# Patient Record
Sex: MALE | Race: WHITE
[De-identification: names, ages, dates, MRNs, and addresses within clinical notes are randomized per-mention and may not be internally consistent; named-entity substitution may affect disease eponyms.]

---

## 2019-08-07 ENCOUNTER — HOSPITAL ENCOUNTER (INPATIENT)
Dept: HOSPITAL 26 - MED | Age: 59
LOS: 2 days | Discharge: ANOTHER HOSPITAL | DRG: 190 | End: 2019-08-09
Attending: GENERAL PRACTICE | Admitting: GENERAL PRACTICE
Payer: COMMERCIAL

## 2019-08-07 VITALS — SYSTOLIC BLOOD PRESSURE: 147 MMHG | DIASTOLIC BLOOD PRESSURE: 109 MMHG

## 2019-08-07 VITALS — DIASTOLIC BLOOD PRESSURE: 91 MMHG | SYSTOLIC BLOOD PRESSURE: 131 MMHG

## 2019-08-07 VITALS — HEIGHT: 71 IN | WEIGHT: 251 LBS | BODY MASS INDEX: 35.14 KG/M2

## 2019-08-07 DIAGNOSIS — E78.5: ICD-10-CM

## 2019-08-07 DIAGNOSIS — Z91.19: ICD-10-CM

## 2019-08-07 DIAGNOSIS — L97.819: ICD-10-CM

## 2019-08-07 DIAGNOSIS — I11.0: ICD-10-CM

## 2019-08-07 DIAGNOSIS — G47.00: ICD-10-CM

## 2019-08-07 DIAGNOSIS — I50.43: ICD-10-CM

## 2019-08-07 DIAGNOSIS — L97.829: ICD-10-CM

## 2019-08-07 DIAGNOSIS — E44.0: ICD-10-CM

## 2019-08-07 DIAGNOSIS — F15.10: ICD-10-CM

## 2019-08-07 DIAGNOSIS — I21.4: Primary | ICD-10-CM

## 2019-08-07 DIAGNOSIS — F17.210: ICD-10-CM

## 2019-08-07 DIAGNOSIS — I73.9: ICD-10-CM

## 2019-08-07 DIAGNOSIS — I87.8: ICD-10-CM

## 2019-08-07 LAB
ALBUMIN FLD-MCNC: 3.3 G/DL (ref 3.4–5)
AMYLASE SERPL-CCNC: 26 U/L (ref 25–115)
ANION GAP SERPL CALCULATED.3IONS-SCNC: 11.3 MMOL/L (ref 8–16)
APPEARANCE UR: CLEAR
AST SERPL-CCNC: 31 U/L (ref 15–37)
BARBITURATES UR QL SCN: (no result) NG/ML
BASOPHILS # BLD AUTO: 0.1 K/UL (ref 0–0.22)
BASOPHILS NFR BLD AUTO: 0.7 % (ref 0–2)
BENZODIAZ UR QL SCN: (no result) NG/ML
BILIRUB SERPL-MCNC: 0.8 MG/DL (ref 0–1)
BILIRUB UR QL STRIP: NEGATIVE
BUN SERPL-MCNC: 9 MG/DL (ref 7–18)
BZE UR QL SCN: (no result) NG/ML
CANNABINOIDS UR QL SCN: (no result) NG/ML
CHLORIDE SERPL-SCNC: 106 MMOL/L (ref 98–107)
CHOLEST/HDLC SERPL: 4.9 {RATIO} (ref 1–4.5)
CO2 SERPL-SCNC: 26.6 MMOL/L (ref 21–32)
COLOR UR: YELLOW
CREAT SERPL-MCNC: 1.2 MG/DL (ref 0.7–1.3)
EOSINOPHIL # BLD AUTO: 0.1 K/UL (ref 0–0.4)
EOSINOPHIL NFR BLD AUTO: 1.4 % (ref 0–4)
ERYTHROCYTE [DISTWIDTH] IN BLOOD BY AUTOMATED COUNT: 15.8 % (ref 11.6–13.7)
GFR SERPL CREATININE-BSD FRML MDRD: 80 ML/MIN (ref 90–?)
GLUCOSE SERPL-MCNC: 131 MG/DL (ref 74–106)
GLUCOSE UR STRIP-MCNC: NEGATIVE MG/DL
HCT VFR BLD AUTO: 42.9 % (ref 36–52)
HDLC SERPL-MCNC: 30 MG/DL (ref 40–60)
HGB BLD-MCNC: 13.9 G/DL (ref 12–18)
HGB UR QL STRIP: NEGATIVE
LDLC SERPL CALC-MCNC: 80 MG/DL (ref 60–100)
LEUKOCYTE ESTERASE UR QL STRIP: NEGATIVE
LIPASE SERPL-CCNC: 86 U/L (ref 73–393)
LYMPHOCYTES # BLD AUTO: 1.5 K/UL (ref 2–11.5)
LYMPHOCYTES NFR BLD AUTO: 20.2 % (ref 20.5–51.1)
MAGNESIUM SERPL-MCNC: 1.8 MG/DL (ref 1.8–2.4)
MCH RBC QN AUTO: 28 PG (ref 27–31)
MCHC RBC AUTO-ENTMCNC: 32 G/DL (ref 33–37)
MCV RBC AUTO: 87.5 FL (ref 80–94)
MONOCYTES # BLD AUTO: 0.7 K/UL (ref 0.8–1)
MONOCYTES NFR BLD AUTO: 8.9 % (ref 1.7–9.3)
NEUTROPHILS # BLD AUTO: 5.2 K/UL (ref 1.8–7.7)
NEUTROPHILS NFR BLD AUTO: 68.8 % (ref 42.2–75.2)
NITRITE UR QL STRIP: NEGATIVE
OPIATES UR QL SCN: (no result) NG/ML
PCP UR QL SCN: (no result) NG/ML
PH UR STRIP: 7 [PH] (ref 5–9)
PHOSPHATE SERPL-MCNC: 3.3 MG/DL (ref 2.5–4.9)
PLATELET # BLD AUTO: 203 K/UL (ref 140–450)
POTASSIUM SERPL-SCNC: 3.9 MMOL/L (ref 3.5–5.1)
PROTHROMBIN TIME: 10.4 SECS (ref 10.8–13.4)
RBC # BLD AUTO: 4.9 MIL/UL (ref 4.2–6.1)
SODIUM SERPL-SCNC: 140 MMOL/L (ref 136–145)
T4 FREE SERPL-MCNC: 0.97 NG/DL (ref 0.76–1.46)
TRIGL SERPL-MCNC: 183 MG/DL (ref 30–150)
TSH SERPL DL<=0.05 MIU/L-ACNC: 2.25 UIU/ML (ref 0.34–3.74)
WBC # BLD AUTO: 7.6 K/UL (ref 4.8–10.8)

## 2019-08-07 PROCEDURE — C1751 CATH, INF, PER/CENT/MIDLINE: HCPCS

## 2019-08-07 RX ADMIN — Medication SCH MG: at 21:04

## 2019-08-07 RX ADMIN — TRIAMCINOLONE ACETONIDE SCH GM: 1 CREAM TOPICAL at 21:00

## 2019-08-07 RX ADMIN — SODIUM CHLORIDE SCH MLS/HR: 9 INJECTION, SOLUTION INTRAVENOUS at 20:27

## 2019-08-07 RX ADMIN — HEPARIN SODIUM SCH MLS/HR: 5000 INJECTION, SOLUTION INTRAVENOUS at 19:58

## 2019-08-07 RX ADMIN — ATORVASTATIN CALCIUM SCH MG: 20 TABLET, FILM COATED ORAL at 21:04

## 2019-08-07 RX ADMIN — IPRATROPIUM BROMIDE AND ALBUTEROL SULFATE PRN ML: .5; 3 SOLUTION RESPIRATORY (INHALATION) at 21:29

## 2019-08-07 NOTE — NUR
RECEIVED PATIENT ON 2L NASAL CANNULA, PULSE OX SAT 96%. PATIENT COMPLAINS OF FEELING 
SOB/APPEARS TO BE IN MILD DISTRESS AND PRESENTS WITH EXPIRATORY WHEEZING. PRN BREATHING 
TREATMENT ADMINISTERED. TOLERATED TX WELL WITHOUT ADVERSE SIDE EFFECTS AND RESPONDED TO 
TREATMENT WITH IMPROVED AERATION. PLACED PATIENT BACK ON 2L NC POST TX. WILL CONTINUE TO 
MONITOR.

## 2019-08-07 NOTE — NUR
PT WAS GIVEN URINAL PER REQUEST. NO SIGNS AND SYMPTOMS OF DISTRES NOTED, FULL 
CLEAR SPEECH. WILL CONTINUE TO MONITOR.

## 2019-08-07 NOTE — NUR
BIB SELF. AAO X4 C/O LEFT CHEST PAIN NON RADIATING & SOB  X 2 HOURS . RADHIKA LEGS 
SWELLING & SLIGHT REDNESS. PERRLA BRISK 3MM, FULL CLEAR SPEECH, EQUAL RADHIKA 
STRENGTH TO UPPER AND LOWER EXTREMITIES. CLEAR RADHIKA LUNGS UPON AUSCULTATION. PT 
PLACED ON FULL CARDIAC MONITOR. MD ER TO EVALUATE PT.

## 2019-08-07 NOTE — NUR
Received endorsement from AM shift RN; patient A/Ox4, able to make needs known, English 
speaking, ambulatory. Introduced self, updated board. No SOB or distress noted, on O2 2LPM 
via nasal cannula. IV site on left antecubital, 20 gauge, saline locked. Skin intact, but 
noted with erythema on bilateral lower extremity. Bed in the lowest position, call light 
within reach. Initial assessment done. Will continue to monitor.

## 2019-08-07 NOTE — NUR
Vitals taken, no distress noted. Patient asleep on right lateral side, visible chest rise 
and fall noted.

-------------------------------------------------------------------------------

Addendum: 08/08/19 at 0153 by Gianluca Albrecht RN

-------------------------------------------------------------------------------

SOB noted, called RT for PRN breathing treatment.

## 2019-08-07 NOTE — NUR
PATIENT ARRIVED FROM ER. NO DISTRESS NOTED. V/S STABLE. ON O2 2L/MIN VIA NC. SKIN INTACT. IV 
SITE INTACT, PATENT, AND ON SALINE LOCK. ORIENTED PATIENT TO ROOM AND CALL LIGHT. REVIEWED 
PLAN OF CARE WITH PATIENT. PATIENT VERBALIZED UNDERSTANDING. SAFETY MEASURES IN PLACE, CALL 
LIGHT WITHIN REACH. WILL CONTINUE TO MONITOR.

## 2019-08-07 NOTE — NUR
Patient will be admitted to care of DR BRAXTON. Admited to TELE.  Will go to room 
119 B. Belongings list completed.  Report to RICHARD GAO.

## 2019-08-08 VITALS — SYSTOLIC BLOOD PRESSURE: 96 MMHG | DIASTOLIC BLOOD PRESSURE: 63 MMHG

## 2019-08-08 VITALS — DIASTOLIC BLOOD PRESSURE: 70 MMHG | SYSTOLIC BLOOD PRESSURE: 105 MMHG

## 2019-08-08 VITALS — SYSTOLIC BLOOD PRESSURE: 109 MMHG | DIASTOLIC BLOOD PRESSURE: 75 MMHG

## 2019-08-08 VITALS — DIASTOLIC BLOOD PRESSURE: 69 MMHG | SYSTOLIC BLOOD PRESSURE: 108 MMHG

## 2019-08-08 VITALS — DIASTOLIC BLOOD PRESSURE: 87 MMHG | SYSTOLIC BLOOD PRESSURE: 116 MMHG

## 2019-08-08 VITALS — DIASTOLIC BLOOD PRESSURE: 99 MMHG | SYSTOLIC BLOOD PRESSURE: 140 MMHG

## 2019-08-08 LAB
ANION GAP SERPL CALCULATED.3IONS-SCNC: 11.6 MMOL/L (ref 8–16)
BASOPHILS # BLD AUTO: 0 K/UL (ref 0–0.22)
BASOPHILS NFR BLD AUTO: 0.6 % (ref 0–2)
BUN SERPL-MCNC: 12 MG/DL (ref 7–18)
CHLORIDE SERPL-SCNC: 103 MMOL/L (ref 98–107)
CO2 SERPL-SCNC: 29.9 MMOL/L (ref 21–32)
CREAT SERPL-MCNC: 1.3 MG/DL (ref 0.7–1.3)
EOSINOPHIL # BLD AUTO: 0.1 K/UL (ref 0–0.4)
EOSINOPHIL NFR BLD AUTO: 1.1 % (ref 0–4)
ERYTHROCYTE [DISTWIDTH] IN BLOOD BY AUTOMATED COUNT: 16 % (ref 11.6–13.7)
GFR SERPL CREATININE-BSD FRML MDRD: 73 ML/MIN (ref 90–?)
GLUCOSE SERPL-MCNC: 136 MG/DL (ref 74–106)
HCT VFR BLD AUTO: 41.9 % (ref 36–52)
HGB BLD-MCNC: 13.5 G/DL (ref 12–18)
LYMPHOCYTES # BLD AUTO: 1.6 K/UL (ref 2–11.5)
LYMPHOCYTES NFR BLD AUTO: 22.3 % (ref 20.5–51.1)
MAGNESIUM SERPL-MCNC: 1.9 MG/DL (ref 1.8–2.4)
MCH RBC QN AUTO: 29 PG (ref 27–31)
MCHC RBC AUTO-ENTMCNC: 32 G/DL (ref 33–37)
MCV RBC AUTO: 88.7 FL (ref 80–94)
MONOCYTES # BLD AUTO: 0.5 K/UL (ref 0.8–1)
MONOCYTES NFR BLD AUTO: 7.1 % (ref 1.7–9.3)
NEUTROPHILS # BLD AUTO: 5 K/UL (ref 1.8–7.7)
NEUTROPHILS NFR BLD AUTO: 68.9 % (ref 42.2–75.2)
PHOSPHATE SERPL-MCNC: 3.7 MG/DL (ref 2.5–4.9)
PLATELET # BLD AUTO: 196 K/UL (ref 140–450)
POTASSIUM SERPL-SCNC: 4.5 MMOL/L (ref 3.5–5.1)
RBC # BLD AUTO: 4.72 MIL/UL (ref 4.2–6.1)
SODIUM SERPL-SCNC: 140 MMOL/L (ref 136–145)
WBC # BLD AUTO: 7.2 K/UL (ref 4.8–10.8)

## 2019-08-08 PROCEDURE — 02HV33Z INSERTION OF INFUSION DEVICE INTO SUPERIOR VENA CAVA, PERCUTANEOUS APPROACH: ICD-10-PCS | Performed by: GENERAL PRACTICE

## 2019-08-08 PROCEDURE — B548ZZA ULTRASONOGRAPHY OF SUPERIOR VENA CAVA, GUIDANCE: ICD-10-PCS | Performed by: GENERAL PRACTICE

## 2019-08-08 RX ADMIN — Medication SCH MG: at 09:03

## 2019-08-08 RX ADMIN — TRIAMCINOLONE ACETONIDE SCH GM: 1 CREAM TOPICAL at 21:39

## 2019-08-08 RX ADMIN — HEPARIN SODIUM SCH MLS/HR: 5000 INJECTION, SOLUTION INTRAVENOUS at 17:31

## 2019-08-08 RX ADMIN — Medication SCH EA: at 09:14

## 2019-08-08 RX ADMIN — LISINOPRIL SCH MG: 10 TABLET ORAL at 09:03

## 2019-08-08 RX ADMIN — Medication SCH EA: at 13:04

## 2019-08-08 RX ADMIN — Medication SCH MG: at 21:00

## 2019-08-08 RX ADMIN — HEPARIN SODIUM SCH MLS/HR: 5000 INJECTION, SOLUTION INTRAVENOUS at 10:00

## 2019-08-08 RX ADMIN — HEPARIN SODIUM SCH MLS/HR: 5000 INJECTION, SOLUTION INTRAVENOUS at 16:33

## 2019-08-08 RX ADMIN — FUROSEMIDE SCH MG: 10 INJECTION, SOLUTION INTRAMUSCULAR; INTRAVENOUS at 21:38

## 2019-08-08 RX ADMIN — ATORVASTATIN CALCIUM SCH MG: 20 TABLET, FILM COATED ORAL at 21:39

## 2019-08-08 RX ADMIN — HEPARIN SODIUM SCH MLS/HR: 5000 INJECTION, SOLUTION INTRAVENOUS at 03:47

## 2019-08-08 RX ADMIN — IPRATROPIUM BROMIDE AND ALBUTEROL SULFATE SCH ML: .5; 3 SOLUTION RESPIRATORY (INHALATION) at 13:20

## 2019-08-08 RX ADMIN — TRIAMCINOLONE ACETONIDE SCH GM: 1 CREAM TOPICAL at 09:04

## 2019-08-08 RX ADMIN — SODIUM CHLORIDE SCH MLS/HR: 9 INJECTION, SOLUTION INTRAVENOUS at 16:43

## 2019-08-08 RX ADMIN — Medication SCH EA: at 16:20

## 2019-08-08 RX ADMIN — Medication SCH MG: at 09:02

## 2019-08-08 RX ADMIN — IPRATROPIUM BROMIDE AND ALBUTEROL SULFATE SCH ML: .5; 3 SOLUTION RESPIRATORY (INHALATION) at 19:15

## 2019-08-08 RX ADMIN — IPRATROPIUM BROMIDE AND ALBUTEROL SULFATE PRN ML: .5; 3 SOLUTION RESPIRATORY (INHALATION) at 00:01

## 2019-08-08 NOTE — NUR
PRN BREATHING TREATMENT ADMINISTERED DUE TO INCREASED FEELING OF SHORTNESS OF BREATH. 
TOLERATED TX WELL, NO ADVERSE SIDE EFFECTS. WILL CONTINUE TO MONITOR.

## 2019-08-08 NOTE — NUR
Received lab result: APTT 48.5, no change in heparin drip protocol. Will continue to run at 
16mL/hr.

## 2019-08-08 NOTE — NUR
Clinicals faxed to Julio Willett [facility] [phone]. [facility] [phone].

## 2019-08-08 NOTE — NUR
GAVE CHANGE OF SHIFT BEDSIDE REPORT TO PM NURSE.  PATIENT'S VITALS ARE STABLE AND NO SIGNS 
OF DISTRESS AT THIS TIME ARE NOTED.

## 2019-08-08 NOTE — NUR
RECEIVED BEDSIDE REPORT FROM NIGHT SHIFT NURSE. PATIENT IS AWAKE, ALERT AND ORIENTEDX4. NO 
RESP DISTRESS ON 2L NC. SKIN HAS ERYTHEMA BLE, POSSIBLY D/T VENOUS STASIS. PATIENT IS 
AMBULATORY. CONTINENT. L AC 20G HEPARIN DRIP AT 14ML/HR. L FA 22G SL. CLEAN, DRY AND INTACT. 
TELE MONITOR IN PLACE. PATIENT ABLE TO MAKE NEEDS KNOWN. BED IN LOW POSITION. CALL LIGHT 
WITHIN REACH. WILL CONTINUE TO MONITOR THE PATIENT

## 2019-08-08 NOTE — NUR
APTT 41.6, HEPARIN BOLUS OF 2,700 UNITS AND INCREASED DRIP TO 16ML/HR. TOLD DR MALDONADO TO 
ADD APTT LEVEL AT 1130PM. WILL CONTINUE TO MONITOR

## 2019-08-08 NOTE — NUR
Spoke with Mo  [phone]. Tracking # [id number]. AUTH for transportation [facility] 
[id number]. AUTH for [facility] [id number].

## 2019-08-08 NOTE — NUR
Received endorsement from AM shift RN; patient A/Ox4, able to make needs known, English 
speaking, ambulatory. Introduced self, updated board. No SOB or distress noted, on O2 2LPM 
via nasal cannula. IV site on left antecubital, 20 gauge, saline locked; left forearm, 22 
gauge, saline locked, and right upper arm PICC line, double lumen, running Heparin drip at 
16mL/hr. Skin intact, but noted with erythema on bilateral lower extremity. Bed in the 
lowest position, call light within reach. Initial assessment done. Will continue to monitor.

## 2019-08-08 NOTE — NUR
Patients vitals stable; all due meds given, will be endorsed to AM shift RN for continuity 
of care.

## 2019-08-08 NOTE — NUR
DAUGHTER CALLED HE WANTS TO KNOW HOW THE PATIENT IS DOING. PER PATIENT DO NOT RELEASE 
INFORMATION TO ANYONE. IF THEY WANT TO KNOW LET THEM CALL HIM DIRECTLY.

## 2019-08-08 NOTE — NUR
PATIENT HAS BEEN SCREENED AND CATEGORIZED AS MODERATE NUTRITION RISK. PATIENT WILL BE SEEN 
WITHIN 3-5 DAYS OF ADMISSION.



08/10/19  08/12/19



DANIEL SAVAGE RD

## 2019-08-09 ENCOUNTER — HOSPITAL ENCOUNTER (INPATIENT)
Dept: HOSPITAL 1 - DU | Age: 59
LOS: 2 days | Discharge: HOME | DRG: 190 | End: 2019-08-11
Attending: HOSPITALIST | Admitting: HOSPITALIST
Payer: COMMERCIAL

## 2019-08-09 VITALS — DIASTOLIC BLOOD PRESSURE: 72 MMHG | SYSTOLIC BLOOD PRESSURE: 112 MMHG

## 2019-08-09 VITALS — WEIGHT: 235.5 LBS | BODY MASS INDEX: 32.97 KG/M2 | HEIGHT: 71 IN

## 2019-08-09 VITALS — SYSTOLIC BLOOD PRESSURE: 108 MMHG | DIASTOLIC BLOOD PRESSURE: 74 MMHG

## 2019-08-09 VITALS — SYSTOLIC BLOOD PRESSURE: 111 MMHG | DIASTOLIC BLOOD PRESSURE: 72 MMHG

## 2019-08-09 VITALS — DIASTOLIC BLOOD PRESSURE: 79 MMHG | SYSTOLIC BLOOD PRESSURE: 114 MMHG

## 2019-08-09 VITALS — DIASTOLIC BLOOD PRESSURE: 78 MMHG | SYSTOLIC BLOOD PRESSURE: 113 MMHG

## 2019-08-09 VITALS — DIASTOLIC BLOOD PRESSURE: 46 MMHG | SYSTOLIC BLOOD PRESSURE: 97 MMHG

## 2019-08-09 DIAGNOSIS — E78.5: ICD-10-CM

## 2019-08-09 DIAGNOSIS — I21.4: Primary | ICD-10-CM

## 2019-08-09 DIAGNOSIS — F17.210: ICD-10-CM

## 2019-08-09 DIAGNOSIS — F15.10: ICD-10-CM

## 2019-08-09 DIAGNOSIS — Z91.14: ICD-10-CM

## 2019-08-09 DIAGNOSIS — I87.8: ICD-10-CM

## 2019-08-09 DIAGNOSIS — I11.0: ICD-10-CM

## 2019-08-09 DIAGNOSIS — I25.2: ICD-10-CM

## 2019-08-09 DIAGNOSIS — I42.9: ICD-10-CM

## 2019-08-09 DIAGNOSIS — G47.00: ICD-10-CM

## 2019-08-09 DIAGNOSIS — I50.43: ICD-10-CM

## 2019-08-09 DIAGNOSIS — E11.9: ICD-10-CM

## 2019-08-09 LAB
ANION GAP SERPL CALCULATED.3IONS-SCNC: 10.3 MMOL/L (ref 8–16)
BASOPHILS # BLD AUTO: 0.1 K/UL (ref 0–0.22)
BASOPHILS NFR BLD AUTO: 0.6 % (ref 0–2)
BUN SERPL-MCNC: 18 MG/DL (ref 7–18)
CHLORIDE SERPL-SCNC: 104 MMOL/L (ref 98–107)
CO2 SERPL-SCNC: 30.9 MMOL/L (ref 21–32)
CREAT SERPL-MCNC: 1.3 MG/DL (ref 0.7–1.3)
EOSINOPHIL # BLD AUTO: 0.1 K/UL (ref 0–0.4)
EOSINOPHIL NFR BLD AUTO: 1.4 % (ref 0–4)
ERYTHROCYTE [DISTWIDTH] IN BLOOD BY AUTOMATED COUNT: 16.1 % (ref 11.6–13.7)
GFR SERPL CREATININE-BSD FRML MDRD: 73 ML/MIN (ref 90–?)
GLUCOSE SERPL-MCNC: 118 MG/DL (ref 74–106)
HCT VFR BLD AUTO: 42.7 % (ref 36–52)
HGB BLD-MCNC: 14 G/DL (ref 12–18)
LYMPHOCYTES # BLD AUTO: 1.7 K/UL (ref 2–11.5)
LYMPHOCYTES NFR BLD AUTO: 17 % (ref 20.5–51.1)
MAGNESIUM SERPL-MCNC: 2.1 MG/DL (ref 1.8–2.4)
MCH RBC QN AUTO: 29 PG (ref 27–31)
MCHC RBC AUTO-ENTMCNC: 33 G/DL (ref 33–37)
MCV RBC AUTO: 88.1 FL (ref 80–94)
MONOCYTES # BLD AUTO: 0.9 K/UL (ref 0.8–1)
MONOCYTES NFR BLD AUTO: 8.7 % (ref 1.7–9.3)
NEUTROPHILS # BLD AUTO: 7.1 K/UL (ref 1.8–7.7)
NEUTROPHILS NFR BLD AUTO: 72.3 % (ref 42.2–75.2)
PHOSPHATE SERPL-MCNC: 4.9 MG/DL (ref 2.5–4.9)
PLATELET # BLD AUTO: 208 K/UL (ref 140–450)
POTASSIUM SERPL-SCNC: 4.2 MMOL/L (ref 3.5–5.1)
RBC # BLD AUTO: 4.84 MIL/UL (ref 4.2–6.1)
SODIUM SERPL-SCNC: 141 MMOL/L (ref 136–145)
WBC # BLD AUTO: 9.8 K/UL (ref 4.8–10.8)

## 2019-08-09 PROCEDURE — G0378 HOSPITAL OBSERVATION PER HR: HCPCS

## 2019-08-09 RX ADMIN — IPRATROPIUM BROMIDE AND ALBUTEROL SULFATE SCH ML: .5; 3 SOLUTION RESPIRATORY (INHALATION) at 09:31

## 2019-08-09 RX ADMIN — Medication SCH MG: at 20:32

## 2019-08-09 RX ADMIN — FUROSEMIDE SCH MG: 10 INJECTION, SOLUTION INTRAMUSCULAR; INTRAVENOUS at 20:32

## 2019-08-09 RX ADMIN — Medication SCH EA: at 12:56

## 2019-08-09 RX ADMIN — ATORVASTATIN CALCIUM SCH MG: 20 TABLET, FILM COATED ORAL at 20:32

## 2019-08-09 RX ADMIN — LISINOPRIL SCH MG: 10 TABLET ORAL at 09:00

## 2019-08-09 RX ADMIN — Medication SCH EA: at 17:31

## 2019-08-09 RX ADMIN — SODIUM CHLORIDE SCH MLS/HR: 9 INJECTION, SOLUTION INTRAVENOUS at 17:05

## 2019-08-09 RX ADMIN — IPRATROPIUM BROMIDE AND ALBUTEROL SULFATE SCH ML: .5; 3 SOLUTION RESPIRATORY (INHALATION) at 14:24

## 2019-08-09 RX ADMIN — HEPARIN SODIUM SCH MLS/HR: 5000 INJECTION, SOLUTION INTRAVENOUS at 18:27

## 2019-08-09 RX ADMIN — FUROSEMIDE SCH MG: 10 INJECTION, SOLUTION INTRAMUSCULAR; INTRAVENOUS at 09:00

## 2019-08-09 RX ADMIN — TRIAMCINOLONE ACETONIDE SCH GM: 1 CREAM TOPICAL at 09:22

## 2019-08-09 RX ADMIN — TRIAMCINOLONE ACETONIDE SCH GM: 1 CREAM TOPICAL at 20:33

## 2019-08-09 RX ADMIN — Medication SCH EA: at 09:22

## 2019-08-09 RX ADMIN — HEPARIN SODIUM SCH MLS/HR: 5000 INJECTION, SOLUTION INTRAVENOUS at 00:30

## 2019-08-09 RX ADMIN — Medication SCH MG: at 09:00

## 2019-08-09 RX ADMIN — Medication SCH MG: at 09:20

## 2019-08-09 RX ADMIN — IPRATROPIUM BROMIDE AND ALBUTEROL SULFATE SCH ML: .5; 3 SOLUTION RESPIRATORY (INHALATION) at 20:37

## 2019-08-09 RX ADMIN — HEPARIN SODIUM SCH MLS/HR: 5000 INJECTION, SOLUTION INTRAVENOUS at 08:23

## 2019-08-09 NOTE — NUR
GAVE REPORT TO RICHARD VIVAR FROM [facility] FOR ROOM 208A, FOR PATIENT'S 
CONTINUITY OF CARE. PATIENT IS AWAKE, ALERT, ORIENTED X 4, IS ON ROOM AIR, HAS RIGHT UPPER 
ARM DOUBLE LUMEN PICC LINE, AND LEFT ARM 20GA SALINE LOCK, HAS BILATERAL LOWER EXTREMITY 
ERYTHEMA, OTHERWISE, SKIN IS INTACT. LATEST VS: /68, T 98.5, O2 95%, P 94, RR 18. 
ADMINISTERED SCHEDULED PO, OINTMENT, AND IV PUSH MEDICATIONS AS ORDERED, PATIENT TOLERATED 
THEM WELL. WILL BE TRANSPORTED VIA AMBULANCE.

## 2019-08-09 NOTE — NUR
ADMINISTERED SCHEDULED MEDICATIONS. PATIENT TOLERATED WELL. PATIENT DENIES ANY PAIN. NO 
OTHER NEEDS AT THIS TIME, WILL CONTINUE TO MONITOR.

## 2019-08-09 NOTE — NUR
D/C PLANNING:

CALLED [facility] HOUSE SUPERVISOR KRISTA -[phone]. ADVISED NO ONE HAS FAXED REQUEST TO 
TRANSFER. FAXED TRANSFER REQUEST INCLUDED AUTHORIZATION FOR [facility] AND [facility] TRANSPORT AUTH.  
WILL FOLLOW UP WITH HOUSE SUPERVISOR FOR ACCEPTING MD AND CARDIOLOGIST. FAXED PACKET TO 
TRANSFER TO [facility] HOUSE SUPERVISOR [phone]

## 2019-08-09 NOTE — NUR
USED PICC TO OBTAIN BLOOD SAMPLE. PATIENT TOLERATED WELL. NO OTHER NEEDS AT THIS TIME. WILL 
CONTINUE TO MONITOR.

## 2019-08-09 NOTE — NUR
ADMINISTERED SCHEDULED MEDICATIONS. PATIENTS BLOOD PRESSURE MEDICATIONS HELD FOR BP OF 
112/72. NO OTHER NEEDS AT THIS TIME, WILL CONTINUE TO MONITOR.

## 2019-08-09 NOTE — NUR
ADMINISTERED SCHEDULED MEDICATION. ADJUSTED HEPARIN DRIP PER PTT PROTOCOL. PATIENT TOLERATED 
WELL. NO OTHER NEEDS AT THIS TIME, WILL CONTINUE TO MONITOR.

## 2019-08-09 NOTE — NUR
ENDORSED TO NIGHT SHIFT NURSE FOR CONTINUITY OF CARE. PATIENT IS STABLE AT THIS TIME.

-------------------------------------------------------------------------------

Addendum: 08/09/19 at 1924 by Stella Grajeda RN

-------------------------------------------------------------------------------

DISCHARGE PAPERWORK DONE. ENDORSED TO SERAFIN TO GIVE REPORT TO [facility]

## 2019-08-09 NOTE — NUR
RECEIVED BEDSIDE REPORT FROM AM SHIFT RN YANET, FOR PATIENT'S CONTINUITY OF CARE. PATIENT 
IS ALERT, AWAKE, ORIENTED X4, ON ROOM AIR, HAS RIGHT UPPER ARM 2 LUMEN PICC LINE, AND LEFT 
ARM 20G SALINE LOCK. PATIENT IS CURRENTLY ON STABLE CONDITION AND WILL BE TRANSFERRED TO 
[facility] FOR CONTINUITY OF CARE.

## 2019-08-09 NOTE — NUR
RECEIVED PT AS A DIRECT ADMIT FROM [facility]. PT WAS TRANSFERRED BY [facility] VIA
STRETCHER. PT STATED THAT HE CAME IN DUE TO CHEST PAIN X3 DAYS AND SOB X1
MONTH. AAOX4. DENIES HEADACHE/DIZZINESS. ABLE TO FOLLOW COMMANDS. DENIES CHEST
PAIN/PRESSURE, SR W/ BBB AND DEPRESSED ST. HR=85. NO SOB NOTED, LUNG SOUNDS
DIMINISHED ON AUSCULTATION, O2 SAT=94% RA. DENIES ABDOMINAL DISCOMFORT. BOWEL
SOUNDS ACTIVE. ABLE TO PASS GAS. ABDOMEN IS DISTENDED AND MILDLY FIRM. VOIDS.
W/ DARK DISCOLORATION AND SCABS ON BLE. W/ RIGHT ARM PICC LINE DOUBLE LUMEN
AND IV ON THE LFA GAUGE 20. CALL LIGHT ON REACH. SIDE RAILS UPX2. PRIMARY
NURSE JOHAN AT BEDSIDE FOR CONTINUITY OF CARE

## 2019-08-09 NOTE — NUR
WOUND CARE EVALUATION NOTE:

BLE SKIN ASSESSMENT DONE, NO OPEN ACTIVE WOUND,ONE SMALL 0.3X0.3 CM CLEAR FLUID FILLED 
BLISTER TO LLE WITH SKIN INTACT AND MULTIPLE DRY SCABS TO BLE, DARK SKIN PIGMENTATION , NO 
ERYTHEMA, NO PAIN AND NOT WARM TO TOUCH, PT C/O ITCHINESS.

RECOMMENDATIONS TO CONTINUE SAME TREATMENTS AS ORDERED.

## 2019-08-09 NOTE — NUR
RECEIVED ENDORSEMENT FROM [facility] NURSE. PATIENT IS AAOX4, ENGLISH SPEAKING. RESPIRATIONS 
ARE EVEN AND UNLABORED ON ROOM AIR. PATIENT DENIES ANY PAIN. RIGHT UPPER ARM DOUBLE LUMEN 
PICC INTACT, PATENT,AND INFUSING HEPARIN DRIP. LEFT AC 20G INTACT AND SL. LEFT FA 22G INTACT 
AND SL. PLAN OF CARE WAS REVIEWED WITH PATIENT, PATIENT VERBALIZED UNDERSTANDING. SAFETY 
MEASURES IN PLACE, CALL LIGHT WITHIN REACH.

## 2019-08-09 NOTE — NUR
PATIENT SLEEPING. ORDERED TURKEY SANDWICH FOR PATIENT PER PATIENT REQUEST. PATIENT DENIES 
ANY PAIN. NO OTHER NEEDS AT THIS TIME, WILL CONTINUE TO MONITOR.

## 2019-08-09 NOTE — NUR
PT STATES HE WANTS TO EAT AT THIS TIME AND WANTS BREATHING TX AFTER BREAKFAST. PT NOT IN ANY 
DISTRESS AT THIS TIME. WILL ADMINISTER BREATHING TX AT A LATER TIME.

## 2019-08-09 NOTE — NUR
PATIENT SIGNED DISCHARGE PAPERS, ACKNOWLEDGE AND VERBALIZED UNDERSTANDING REASON FOR 
TRANSFER. PATIENT IS TRANSPORTED VIA GURNEY IN AMBULANCE. RECEIVING RN, [phone].

## 2019-08-09 NOTE — NUR
PATIENT RESTING IN BED. PATIENT DENIES ANY PAIN AT THIS TIME. NO OTHER NEEDS AT THIS TIME, 
WILL CONTINUE TO MONITOR.

## 2019-08-10 VITALS — SYSTOLIC BLOOD PRESSURE: 112 MMHG | DIASTOLIC BLOOD PRESSURE: 77 MMHG

## 2019-08-10 VITALS — SYSTOLIC BLOOD PRESSURE: 100 MMHG | DIASTOLIC BLOOD PRESSURE: 71 MMHG

## 2019-08-10 VITALS — SYSTOLIC BLOOD PRESSURE: 102 MMHG | DIASTOLIC BLOOD PRESSURE: 65 MMHG

## 2019-08-10 VITALS — DIASTOLIC BLOOD PRESSURE: 88 MMHG | SYSTOLIC BLOOD PRESSURE: 125 MMHG

## 2019-08-10 VITALS — DIASTOLIC BLOOD PRESSURE: 86 MMHG | SYSTOLIC BLOOD PRESSURE: 122 MMHG

## 2019-08-10 LAB
AMPHETAMINES UR QL SCN: (no result)
BASOPHILS NFR BLD: 0.7 % (ref 0–2)
BUN SERPL-MCNC: 18 MG/DL (ref 7–18)
CALCIUM SERPL-MCNC: 9.1 MG/DL (ref 8.5–10.1)
CHLORIDE SERPL-SCNC: 104 MMOL/L (ref 98–107)
CHOLEST SERPL-MCNC: 130 MG/DL (ref ?–200)
CHOLEST/HDLC SERPL: 3.5 MG/DL
CO2 SERPL-SCNC: 29.7 MMOL/L (ref 21–32)
CREAT SERPL-MCNC: 1.3 MG/DL (ref 0.7–1.3)
ERYTHROCYTE [DISTWIDTH] IN BLOOD BY AUTOMATED COUNT: 15.9 % (ref 11.5–14.5)
GFR SERPLBLD BASED ON 1.73 SQ M-ARVRAT: > 60 ML/MIN
GLUCOSE SERPL-MCNC: 102 MG/DL (ref 74–106)
HDLC SERPL-MCNC: 37 MG/DL (ref 40–60)
MAGNESIUM SERPL-MCNC: 2.1 MG/DL (ref 1.8–2.4)
MAGNESIUM SERPL-MCNC: 2.3 MG/DL (ref 1.8–2.4)
MICROSCOPIC UR-IMP: NO
PHOSPHATE SERPL-MCNC: 4.3 MG/DL (ref 2.5–4.9)
PHOSPHATE SERPL-MCNC: 4.9 MG/DL (ref 2.5–4.9)
PLATELET # BLD: 216 X10^3MCL (ref 130–400)
POTASSIUM SERPL-SCNC: 4 MMOL/L (ref 3.5–5.1)
RBC # UR STRIP.AUTO: NEGATIVE /UL
SODIUM SERPL-SCNC: 142 MMOL/L (ref 136–145)
T3 SERPL-MCNC: 1.19 NG/ML
T3RU NFR SERPL: 33 % UPTAKE (ref 33–39)
T4 FREE SERPL-MCNC: 0.79 NG/DL (ref 0.76–1.46)
T4 SERPL-MCNC: 6.4 UG/DL (ref 4.7–13.3)
T4/T3 UPTAKE INDEX SERPL: 2.1 UG/DL (ref 1.4–4.5)
TRIGL SERPL-MCNC: 205 MG/DL (ref ?–150)
UA SPECIFIC GRAVITY: 1.01 (ref 1–1.03)

## 2019-08-10 NOTE — NUR
RECEIVED PT FROM DAY SHIFT RN. PT AAOX4. DENIES HA/DIZZINESS. PT BREATHING
EVEN AND UNLABORED ON RA WITH NO SOB NOTED. RT PROTOCOL. TELE #3 SR WITH
DEPRESSED ST, HR 89. PT DENIES CHEST PAIN OR PRESSURE. IV LFA PATENT SL. PICC
LINE TO RIGHT ARM, PATENT. NO SIGNS OF ACUTE DISTRESS. CALL BUTTON WITHIN
REACH. SAFETY PRECAUTIONS IN PLACE. WILL CONTINUE TO MONITOR.

## 2019-08-10 NOTE — NUR
RECEIVED CALL FROM LAB THAT PATIENT PTT IS >150. HEPARIN INFUSION PAUSED.
SPOKE WITH DR LO SAYED AND MADE AWARE. STATES WILL ORDER PTT Q2H UNTIL PTT
<120 PER HEPARIN INFUSION PROTOCOL. CHARGE NURSE ABBY MADE AWARE. PATIENT
SLEEPING IN BED AT THIS TIME, DR GARCIA AND TEAM IN TO SPEAK W PATIENT BUT
PATIENT ASLEEP. CALL LIGHT IN REACH.

## 2019-08-10 NOTE — NUR
PT SLEPT MOST OF THE NIGHT WITH NO SIGNS OF DISTRESS. MEDICATED PER EMAR.
HEPARIN DRIP INFUSING AT 1300 UNITS/HR. NO BLEEDING/BRUISING NOTED. PT DENIES
CHEST PAIN. ENDORSED CARE TO DAY SHIFT RN, ALL QUESTIONS ADDRESSED.

## 2019-08-10 NOTE — NUR
HEPARIN INFUSION STARTED AT THIS TIME PER HEPARIN DRIP PROTOCOL AT 1300
UNITS/HR. INITIAL LOADING DOSE OF 64OO UNITS GIVEN AT THIS TIME. NO SIGNS OF
BLEEDING/BRUSIING NOTED. WILL CONTINUE TO MONITOR.

## 2019-08-10 NOTE — NUR
PT REPORTED HAVING GENREALIZED BODY PAIN. MEDICATED PER EMAR. CALL BUTTON
WITHIN REACH. SAFETY PRECAUTIONS IN PLACE. WILL CONTINUE TO MONITOR.

## 2019-08-10 NOTE — NUR
HEPARIN INFUSION STARTED AT THIS TIME PER HEPARIN DRIP PROTOCOL AT 1300
UNITS/HR. NO SIGNS OF BLEEDING/BRUSIING NOTED. WILL CONTINUE TO MONITOR.

## 2019-08-10 NOTE — NUR
PATIENT IN BED AT THIS TIME. DR JOHNSON IN TO SPEAK WITH PATIENT AND STATES THAT
HE WILL DC HEPARIN DRIP AND LIKELY DC CATHERIZATION SCHEDULED FOR MONDAY.
PATIENT VERBALIZES UNDERSTANDING AND AGREES WITH PLAN.  CALL LIGHT IN REACH,
WILL ENDORSE TO ONCOMING NURSE.

## 2019-08-10 NOTE — NUR
RECEIVED PATIENT FROM NIGHAT PRADO.  PATIENT IN BED AT THIS TIME, NO COMPLAINTS.
STATES THAT "THEY KEPT WAKING HIM UP AT NIGHT". SPOKE WITH PATIENT ABOUT PLAN
OF CARE FOR TODAY AND PATIENT AGREES. WILL AWAIT CARE TEAM TO COME IN AND
SPEAK WITH PATIENT. CALL LIGHT IN REACH AT THIS TIME.

## 2019-08-10 NOTE — NUR
PER HEPARIN DRIP PROTOCOL, 6400 U HEPARIN NEEDED FOR BOLUS. VERIFIED AND
COSIGNED BY NIGHAT MCKEON. WITNESSED BY NIGHAT BARCLAY. SPOKE WITH DR LO SAYED FOR
CHANGED OF INFUSION RATE, OK UP TO MAX DOSE OF 1500 UNITS/HR. PATIENT IN BED
AT THIS TIME WITH NO COMPLAINTS. COMPLIANT WITH BREATHING TREATMENT AT THIS
TIME. CALL LIGHT AND PHONE IN REACH.

## 2019-08-10 NOTE — NUR
FU PTT RESULT FROM LAB IS 23.6. INFORMED CHARGE NURSE LEOBARDO AND DR LO SAYED
FOR LAB VALUE DISCREPANCY AT PREVIOUS VALUE WAS >150. WILL REORDER PTT DRAW
MODIFIED FROM PERIPHERY ACCESS VIA L ARM, AND RESTART HEPARIN DRIP
ACCORDINGLY.

## 2019-08-10 NOTE — NUR
PT RESTING. NO SIGNS OF DISTRESS NOTED. CALL BUTTON WITHIN REACH. SAFETY
PRECAUTIONS IN PLACE. WILL MONITOR.

## 2019-08-10 NOTE — NUR
SPOKE WITH WIFE ON PHONE ABOUT PATIENT CONDITION AND PLAN OF CARE. PATIENT UP
TO CHAIR FOR LUNCH, THEN PREPARED FOR SHOWER. SHOWER OK PER DR LO SAYED, ORDER
PRESENT.

## 2019-08-10 NOTE — NUR
ROUNDS MADE. PT RESTING. NO SIGNS OF DISTRESS NOTED. CALL BUTTON WITHIN REACH.
SAFETY PRECAUTIONS IN PLACE. WILL CONTINUE TO MONITOR.

## 2019-08-11 VITALS — SYSTOLIC BLOOD PRESSURE: 114 MMHG | DIASTOLIC BLOOD PRESSURE: 78 MMHG

## 2019-08-11 VITALS — SYSTOLIC BLOOD PRESSURE: 114 MMHG | DIASTOLIC BLOOD PRESSURE: 88 MMHG

## 2019-08-11 VITALS — DIASTOLIC BLOOD PRESSURE: 78 MMHG | SYSTOLIC BLOOD PRESSURE: 114 MMHG

## 2019-08-11 VITALS — DIASTOLIC BLOOD PRESSURE: 86 MMHG | SYSTOLIC BLOOD PRESSURE: 119 MMHG

## 2019-08-11 LAB
BASOPHILS NFR BLD: 0.7 % (ref 0–2)
BUN SERPL-MCNC: 21 MG/DL (ref 7–18)
CALCIUM SERPL-MCNC: 9.2 MG/DL (ref 8.5–10.1)
CHLORIDE SERPL-SCNC: 103 MMOL/L (ref 98–107)
CO2 SERPL-SCNC: 29.6 MMOL/L (ref 21–32)
CREAT SERPL-MCNC: 1.1 MG/DL (ref 0.7–1.3)
ERYTHROCYTE [DISTWIDTH] IN BLOOD BY AUTOMATED COUNT: 16 % (ref 11.5–14.5)
GFR SERPLBLD BASED ON 1.73 SQ M-ARVRAT: > 60 ML/MIN
GLUCOSE SERPL-MCNC: 98 MG/DL (ref 74–106)
MAGNESIUM SERPL-MCNC: 2.2 MG/DL (ref 1.8–2.4)
PHOSPHATE SERPL-MCNC: 3.9 MG/DL (ref 2.5–4.9)
PLATELET # BLD: 221 X10^3MCL (ref 130–400)
POTASSIUM SERPL-SCNC: 4.1 MMOL/L (ref 3.5–5.1)
SODIUM SERPL-SCNC: 140 MMOL/L (ref 136–145)

## 2019-08-11 NOTE — NUR
DR GARCIA & DR LO SAYED IN TO SPEAK WITH PATIENT. STATES THAT PATIENT WILL
LIKELY BE DISCHARGED TODAY BUT IT IS NECESSARY FOR PATIENT TO FU WITH A PCP.
PATIENT STATES THAT HE DOES NOT SEE A PCP. WILL WAIT FOR DISCHARGE ORDERS,
PRESCRIPTIONS AND RESOURCES TO BE GIVEN TO PATIENT. PATIENT VERBALIZES
UNDERSTANDING. NO CHEST PAIN AT THIS TIME.

## 2019-08-11 NOTE — NUR
DISCHARGE INSTRUCTIONS GIVEN AND EXPLAINED TO PATIENT. PRESCRIPTIONS ALSO
GIVEN TO PATIENT AND EXPLAINED.  ALL QUESTIONS ADDRESSED. SIGNATURES
OBTAINED, ID BANDS REMOVED.  IV CATHETER REMOVED AND INTACT, TELEMETRY UNIT
RETURNED TO TECH HENRIQUE.  PICC LINE NURSE HENRIQUE IN DURING DISCHARGE AND PICC LINE
REMOVED AND INTACT. PATIENT WITH ALL BELONGINGS ESCORTED DOWN TO [facility], VIA WHEELCHAIR.

## 2019-08-11 NOTE — NUR
PT FOR DISCHARGE HOME TODAY. PT WITH PICC LINE ZUNILDA, YRN ORDER FROM 
(RESIDENT) ASSIGNED TO THIS PT TO DISCONTINUE PICC LINE PRIOR TO DISCHARGE
HOME TODAY. CALLED PICC LINE COMPANY AND SPOKE TO HENRIQUE(PICC LINE RN) AND MADE
HIM AWARE OF ABOVE'S ORDER, HE SAYS HE WILL BE COMING IN 3O MINITES TO
DISCONTUNUE THE OICC LINE.
JIAN DISLA ASSIGNED TO THIS PT MADE AWARE OF ABOVE.

## 2019-08-11 NOTE — NUR
PT RESTING. BREATHING EVEN AND UNLABORED WITH NO SIGNS OF DISTRESS. CALL
BUTTON WITHIN REACH. SAFETY PRECAUTIONS IN PLACE. WILL CONTINUE TO MONITOR.

## 2019-08-11 NOTE — NUR
PT SLEPT MOST OF THE NIGHT WITH NO SIGNS OF DISTRESS. MEDICATED PER EMAR. PT
CONTINUE TO REFUSE ACCU CHECKS. PT REPORTED GENERALIZED PAIN, MEDICATED PER
EMAR WITH RELIEF. CALL BUTTON WITHIN REACH. SAFETY PRECAUTIONS IN PLACE. WILL
CONTINUE TO MONITOR AND ENDORSE CARE TO DAY SHIFT RN.

## 2019-08-11 NOTE — NUR
RECEIVED PATIENT FROM NIGHAT PRADO. PATIENT IN BED SLEEPING AT THIS TIME. NO
S/S OF CP OR SOB. WILL UPDATE PATIENT TO PLAN FOR TODAY ONCE PATIENT AWAKENS.
WILL LIGHT IN REACH AT THIS TIME.

## 2019-12-19 ENCOUNTER — HOSPITAL ENCOUNTER (INPATIENT)
Dept: HOSPITAL 26 - MED | Age: 59
LOS: 1 days | Discharge: LEFT BEFORE DISCHARGE | DRG: 812 | End: 2019-12-20
Attending: GENERAL PRACTICE | Admitting: GENERAL PRACTICE
Payer: MEDICAID

## 2019-12-19 VITALS — SYSTOLIC BLOOD PRESSURE: 111 MMHG | DIASTOLIC BLOOD PRESSURE: 75 MMHG

## 2019-12-19 VITALS — DIASTOLIC BLOOD PRESSURE: 88 MMHG | SYSTOLIC BLOOD PRESSURE: 126 MMHG

## 2019-12-19 VITALS — DIASTOLIC BLOOD PRESSURE: 102 MMHG | SYSTOLIC BLOOD PRESSURE: 132 MMHG

## 2019-12-19 VITALS — SYSTOLIC BLOOD PRESSURE: 129 MMHG | DIASTOLIC BLOOD PRESSURE: 98 MMHG

## 2019-12-19 VITALS — HEIGHT: 71 IN | WEIGHT: 268 LBS | BODY MASS INDEX: 37.52 KG/M2

## 2019-12-19 VITALS — SYSTOLIC BLOOD PRESSURE: 113 MMHG | DIASTOLIC BLOOD PRESSURE: 87 MMHG

## 2019-12-19 VITALS — SYSTOLIC BLOOD PRESSURE: 107 MMHG | DIASTOLIC BLOOD PRESSURE: 86 MMHG

## 2019-12-19 DIAGNOSIS — I11.0: ICD-10-CM

## 2019-12-19 DIAGNOSIS — E83.42: ICD-10-CM

## 2019-12-19 DIAGNOSIS — I50.43: ICD-10-CM

## 2019-12-19 DIAGNOSIS — T50.1X6A: ICD-10-CM
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
## 2019-12-19 DIAGNOSIS — I48.91: ICD-10-CM

## 2019-12-19 DIAGNOSIS — F17.210: ICD-10-CM

## 2019-12-19 DIAGNOSIS — I21.A1: ICD-10-CM

## 2019-12-19 DIAGNOSIS — Z82.49: ICD-10-CM

## 2019-12-19 DIAGNOSIS — T43.621A: Primary | ICD-10-CM

## 2019-12-19 DIAGNOSIS — F15.10: ICD-10-CM

## 2019-12-19 DIAGNOSIS — I25.2: ICD-10-CM

## 2019-12-19 DIAGNOSIS — E44.0: ICD-10-CM

## 2019-12-19 DIAGNOSIS — Z79.899: ICD-10-CM

## 2019-12-19 DIAGNOSIS — I87.2: ICD-10-CM

## 2019-12-19 DIAGNOSIS — Z83.3: ICD-10-CM

## 2019-12-19 DIAGNOSIS — I48.92: ICD-10-CM

## 2019-12-19 LAB
ALBUMIN FLD-MCNC: 3.6 G/DL (ref 3.4–5)
AMORPH SED URNS QL MICRO: (no result) /HPF
ANION GAP SERPL CALCULATED.3IONS-SCNC: 12.2 MMOL/L (ref 8–16)
ANION GAP SERPL CALCULATED.3IONS-SCNC: 12.3 MMOL/L (ref 8–16)
APPEARANCE UR: CLEAR
AST SERPL-CCNC: 44 U/L (ref 15–37)
BARBITURATES UR QL SCN: (no result) NG/ML
BASOPHILS # BLD AUTO: 0 K/UL (ref 0–0.22)
BASOPHILS # BLD AUTO: 0 K/UL (ref 0–0.22)
BASOPHILS NFR BLD AUTO: 0.4 % (ref 0–2)
BASOPHILS NFR BLD AUTO: 0.5 % (ref 0–2)
BENZODIAZ UR QL SCN: (no result) NG/ML
BILIRUB SERPL-MCNC: 0.7 MG/DL (ref 0–1)
BILIRUB UR QL STRIP: NEGATIVE
BUN SERPL-MCNC: 8 MG/DL (ref 7–18)
BUN SERPL-MCNC: 8 MG/DL (ref 7–18)
BZE UR QL SCN: (no result) NG/ML
CANNABINOIDS UR QL SCN: (no result) NG/ML
CHLORIDE SERPL-SCNC: 104 MMOL/L (ref 98–107)
CHLORIDE SERPL-SCNC: 105 MMOL/L (ref 98–107)
CHOLEST/HDLC SERPL: 2.9 {RATIO} (ref 1–4.5)
CO2 SERPL-SCNC: 27.4 MMOL/L (ref 21–32)
CO2 SERPL-SCNC: 27.6 MMOL/L (ref 21–32)
COLOR UR: YELLOW
CREAT SERPL-MCNC: 1.2 MG/DL (ref 0.7–1.3)
CREAT SERPL-MCNC: 1.3 MG/DL (ref 0.7–1.3)
EOSINOPHIL # BLD AUTO: 0.1 K/UL (ref 0–0.4)
EOSINOPHIL # BLD AUTO: 0.1 K/UL (ref 0–0.4)
EOSINOPHIL NFR BLD AUTO: 0.7 % (ref 0–4)
EOSINOPHIL NFR BLD AUTO: 1.2 % (ref 0–4)
ERYTHROCYTE [DISTWIDTH] IN BLOOD BY AUTOMATED COUNT: 15.2 % (ref 11.6–13.7)
ERYTHROCYTE [DISTWIDTH] IN BLOOD BY AUTOMATED COUNT: 15.4 % (ref 11.6–13.7)
GFR SERPL CREATININE-BSD FRML MDRD: 73 ML/MIN (ref 90–?)
GFR SERPL CREATININE-BSD FRML MDRD: 80 ML/MIN (ref 90–?)
GLUCOSE SERPL-MCNC: 115 MG/DL (ref 74–106)
GLUCOSE SERPL-MCNC: 129 MG/DL (ref 74–106)
GLUCOSE UR STRIP-MCNC: NEGATIVE MG/DL
HCT VFR BLD AUTO: 38.4 % (ref 36–52)
HCT VFR BLD AUTO: 39.9 % (ref 36–52)
HDLC SERPL-MCNC: 35 MG/DL (ref 40–60)
HGB BLD-MCNC: 12.6 G/DL (ref 12–18)
HGB BLD-MCNC: 13.2 G/DL (ref 12–18)
HGB UR QL STRIP: (no result)
LDLC SERPL CALC-MCNC: 42 MG/DL (ref 60–100)
LEUKOCYTE ESTERASE UR QL STRIP: NEGATIVE
LIPASE SERPL-CCNC: 79 U/L (ref 73–393)
LYMPHOCYTES # BLD AUTO: 2.1 K/UL (ref 2–11.5)
LYMPHOCYTES # BLD AUTO: 2.4 K/UL (ref 2–11.5)
LYMPHOCYTES NFR BLD AUTO: 25.3 % (ref 20.5–51.1)
LYMPHOCYTES NFR BLD AUTO: 27.6 % (ref 20.5–51.1)
MAGNESIUM SERPL-MCNC: 1.7 MG/DL (ref 1.8–2.4)
MAGNESIUM SERPL-MCNC: 1.7 MG/DL (ref 1.8–2.4)
MCH RBC QN AUTO: 29 PG (ref 27–31)
MCH RBC QN AUTO: 30 PG (ref 27–31)
MCHC RBC AUTO-ENTMCNC: 33 G/DL (ref 33–37)
MCHC RBC AUTO-ENTMCNC: 33 G/DL (ref 33–37)
MCV RBC AUTO: 89.4 FL (ref 80–94)
MCV RBC AUTO: 90.1 FL (ref 80–94)
MONOCYTES # BLD AUTO: 0.8 K/UL (ref 0.8–1)
MONOCYTES # BLD AUTO: 0.9 K/UL (ref 0.8–1)
MONOCYTES NFR BLD AUTO: 10.7 % (ref 1.7–9.3)
MONOCYTES NFR BLD AUTO: 9.3 % (ref 1.7–9.3)
NEUTROPHILS # BLD AUTO: 5.2 K/UL (ref 1.8–7.7)
NEUTROPHILS # BLD AUTO: 5.4 K/UL (ref 1.8–7.7)
NEUTROPHILS NFR BLD AUTO: 60 % (ref 42.2–75.2)
NEUTROPHILS NFR BLD AUTO: 64.3 % (ref 42.2–75.2)
NITRITE UR QL STRIP: NEGATIVE
OPIATES UR QL SCN: (no result) NG/ML
PCP UR QL SCN: (no result) NG/ML
PH UR STRIP: 6.5 [PH] (ref 5–9)
PHOSPHATE SERPL-MCNC: 3.9 MG/DL (ref 2.5–4.9)
PHOSPHATE SERPL-MCNC: 4.1 MG/DL (ref 2.5–4.9)
PLATELET # BLD AUTO: 193 K/UL (ref 140–450)
PLATELET # BLD AUTO: 201 K/UL (ref 140–450)
POTASSIUM SERPL-SCNC: 3.7 MMOL/L (ref 3.5–5.1)
POTASSIUM SERPL-SCNC: 3.8 MMOL/L (ref 3.5–5.1)
PROTHROMBIN TIME: 10.8 SECS (ref 10.8–13.4)
RBC # BLD AUTO: 4.3 MIL/UL (ref 4.2–6.1)
RBC # BLD AUTO: 4.43 MIL/UL (ref 4.2–6.1)
RBC #/AREA URNS HPF: (no result) /HPF (ref 0–5)
SODIUM SERPL-SCNC: 140 MMOL/L (ref 136–145)
SODIUM SERPL-SCNC: 141 MMOL/L (ref 136–145)
TRIGL SERPL-MCNC: 120 MG/DL (ref 30–150)
TSH SERPL DL<=0.05 MIU/L-ACNC: 1.99 UIU/ML (ref 0.34–3.74)
WBC # BLD AUTO: 8.3 K/UL (ref 4.8–10.8)
WBC # BLD AUTO: 8.6 K/UL (ref 4.8–10.8)
WBC,URINE: (no result) /HPF (ref 0–5)

## 2019-12-19 RX ADMIN — IPRATROPIUM BROMIDE AND ALBUTEROL SULFATE SCH ML: .5; 3 SOLUTION RESPIRATORY (INHALATION) at 12:59

## 2019-12-19 RX ADMIN — IPRATROPIUM BROMIDE AND ALBUTEROL SULFATE SCH ML: .5; 3 SOLUTION RESPIRATORY (INHALATION) at 07:48

## 2019-12-19 RX ADMIN — FUROSEMIDE SCH MG: 10 INJECTION, SOLUTION INTRAMUSCULAR; INTRAVENOUS at 21:12

## 2019-12-19 RX ADMIN — IPRATROPIUM BROMIDE AND ALBUTEROL SULFATE SCH ML: .5; 3 SOLUTION RESPIRATORY (INHALATION) at 18:00

## 2019-12-19 RX ADMIN — DIGOXIN SCH MG: 0.25 INJECTION INTRAMUSCULAR; INTRAVENOUS at 21:21

## 2019-12-19 RX ADMIN — Medication SCH MG: at 09:02

## 2019-12-19 RX ADMIN — HEPARIN SODIUM SCH MLS/HR: 5000 INJECTION, SOLUTION INTRAVENOUS at 16:12

## 2019-12-19 RX ADMIN — HEPARIN SODIUM SCH MLS/HR: 5000 INJECTION, SOLUTION INTRAVENOUS at 23:41

## 2019-12-19 RX ADMIN — SODIUM CHLORIDE SCH MLS/HR: 9 INJECTION, SOLUTION INTRAVENOUS at 05:37

## 2019-12-19 RX ADMIN — Medication SCH MG: at 21:13

## 2019-12-19 RX ADMIN — HEPARIN SODIUM SCH MLS/HR: 5000 INJECTION, SOLUTION INTRAVENOUS at 08:29

## 2019-12-19 RX ADMIN — LISINOPRIL SCH MG: 10 TABLET ORAL at 09:00

## 2019-12-19 RX ADMIN — Medication SCH MG: at 12:03

## 2019-12-19 NOTE — NUR
Pt c/o feeling restless, HR = 146 on tele, /66. Lorazepam administered. Left hand IV 
intact with ongoing heparin drip @ 1450unit/h, left AC IV intact with ongoing mg-rider. 
Instructed pt on deep breathing ex, & to call for assistance when getting OOB. Pt verbalized 
understanding. Bed alarm on. Call light within reach. Will cont to monitor.

## 2019-12-19 NOTE — NUR
Dr Dubon notified of persistent tachycardia 140-145/min on tele. Per physician, he will f/u 
with Dr Bhatia (cardiologist). Pt currently in high fowlers in bed, aaox4, eating lunch. No c/o 
discomfort or restlessness. Left hand IV intact & asymptomatic with ongoing heparin drip at 
1450unit/h, left AC IV intact & asymptomatic with ongoing NS @ 10ml/h. Call light within 
reach. Left voicemail for Elidia () that pt is now awake.

## 2019-12-19 NOTE — NUR
Dr. Dubon notified of persistent tachycardia and uncontrolled aflutter. Per physician, no 
new orders at this time. Continue to monitor heart rate. Patient sitting up, eating dinner, 
A&Ox4, no complaints of discomfort.

## 2019-12-19 NOTE — NUR
Received report from PM nursePapa. Patient asleep, respirations even and labored, no signs 
of distressed. IV site on left arm running NS @24 ml/hr. Fall risk, bed alarm is on, fall 
precautions in place, call light within reach.

## 2019-12-19 NOTE — NUR
RECEIVED PT IN STABLE CONDITION FROM AM NURSE FOR CONTINUITY OF CARE. PT IS AWAKE, ALERT AND 
ORIENTEDX3-4 . ON TELE MONITOR-ST  /MIN - 145-MIN. ON O22L/NC. SOMEWHAT RESTLESS AND 
ANXIOUS.  HEPARIN DRIP INFUSING ON THE LEFT HAND G#22. AND MAIN IVF ON THE RT AC g#20. 
INFUSING WELL.  BED ON LOWEST POSITION. FREQ ROUNDS NEEDED. FOR PT  SIT ON SIDE OF BED WITH 
NO O2 . INSTRUCTED PT TO STAY IN BED SO O2 WILL BE IN USE. CALL LIGHT AND URINAL PLACED 
WITHIN EASY REACH. WILL CONTINUE TO MONITOR.

## 2019-12-19 NOTE — NUR
DR. NICK ,RESIDENT  MADE AWARE THAT HR STILL  LIKE 145-146/MIN. SOMETIMES FLUCTUATES TO 
123/MIN. SHE SAID TO GIVE THE LANOXIN SCHEDULED FOR 2200 AND SHE WILL ORDER STAT EKG.

## 2019-12-19 NOTE — NUR
Assessed pt per RT report of non-responsiveness. Pt asleep in semifowlers position. 
Ultrasound tech at bedside performing BLE venous doppler. Pt opened eyes and looked at nurse 
when name was called, verbalized "yeah", but went back to sleep shortly after, no signs of 
distress. Vital signs: /75, , RR 18/min, SaO2 = 98% on O2 @ 2Lpm via n/c. Call 
light within reach. Will cont to monitor.

## 2019-12-19 NOTE — NUR
Pt back in bed. /75, HR = 139. Dr Dubon & Dr Bhatia in nurse station notified. Per 
physicians, ok to give lasix, hold cardizem until further orders. Pt currently resting in 
bed, no signs of distress, aaox4. Call light within reach. Will cont to monitor. Dr Bhatia to 
see pt in room.

## 2019-12-19 NOTE — NUR
NON RESPONSIVE UNABLE TO WAKE PATIENT AT THIS TIME TO PARTICIPATE IN INCENTIVE SPIROMETRY 
THERAPY THERAPY RCP TO ATTEMPT AT A LATER TIME CLEMENCIA/RN ADVISED OF PATIENT LOC

## 2019-12-19 NOTE — NUR
Heparin 7200 unit bolus given to left hand IV 22G, heparin drip initiated to same site @ 
1450unit/h, verified with charge nurse. Per Dr. Dubon, hold lisinopril & give routine 
metoprolol d/t /86  on tele. Pt resting in bed, no c/o discomfort, resp even & 
nonlabored.

## 2019-12-19 NOTE — NUR
Patient continued to complain of anxiety, tachycardia, and restlessness. Heart rate 142. Dr. Dubon notified. Per physician will input orders for ativan and metoprolol. Reassurance 
provided to patient. Will continue to monitor.

## 2019-12-19 NOTE — NUR
58 Y/O MALE C/O SOB X1 DAY. PATIENT STATED THAT HE MISSED LASIX. TOOK TWO PILLS 
TWO HOURS AGO AND IS NOW HAVINE SEVERE SOB. A/OX4 AND FOLLOWS COMMANDS; 
BREATHING LABORED. AUDIBLE WHEEZING HEARD. 99% ON RA. ABDOMEN IS FIRM AND 
ROUND. CHEST PAIN IS A 4/10. ERMD MADE AWARE OF STATUS. SIDE RAILSX1. PLACED ON 
MONITOR. PATIENT ADMITS TO USING METHAMPHETAMINES. ERMD MADE AWARE OF STATUS. 
SIDE RAILSX1. PLACED ON MONITOR. WILL CONTINUE TO MONITOR.



Hx: x10 cig QD. COPD. HTN

RX:SEE MED. REC

NKDA

## 2019-12-19 NOTE — NUR
Note:



I went to patient's room twice today, I attempted to wake him up, patient would not wake up. 
Per patient's nurse Vanessa, patient was given ativan. I requested RN Vanessa to please let me 
know once patient wakes up.

## 2019-12-19 NOTE — NUR
Ativan and metoprolol administered as ordered. Patient repositioned on high choudhary's. Call 
light within reach.

## 2019-12-19 NOTE — NUR
ADMITTED THIS 59 YEAR OLD MALE FROM ER PER NICHOLE WITH CC OF SOB, AMBULATED TO BED WITH 
ASSIST DUE TO UNSTEADY GAIT, ASSESSMENT DONE, VITAL SIGNS TAKEN, BP STABLE, HR ST WITH 144 
BPM, PT DENIES ANY CHEST PAIN, ON O2 2L NC, SLIGHTLY TACHYPNEIC AT 24 RR, ABLE TO SPEAK IN 
FULL SENTENCES, SAT-98%, PLAN OF CARE DISCUSSED, SAFETY MEASURES IN PLACE, CALL LIGHT WITHIN 
REACH.

## 2019-12-19 NOTE — NUR
PATIENT HAS BEEN SCREENED AND CATEGORIZED AS MODERATE NUTRITION RISK. PATIENT WILL BE SEEN 
WITHIN 3-5 DAYS OF ADMISSION.



12/21/19 12/23/19



DANIEL SAVAGE RD

## 2019-12-19 NOTE — NUR
DC PLANN[age] YRS OLD WAS ADMITTED FROM HOME WITH A DX OF ELEVATED TROPONIN, METH ABUSE. PT HAS A HX OF 
CHF, MI, VENOUS STASIS ULCER  AND HTN.  TROP 0.706 ,CXR REPRESENT VASCULAR CONGESTION EKG 
ATRIAL FLUTTER ACS PROTOCOL STARTED  CT HEAD(-) LASIX 20 MG IVP GIVEN STARTED ON HEPARIN 
DRIP . CARDIOLOGY AND WOUND CARE CONSULT DC PLAN TO GO HOME WHEN STABLE .CM TO FOLLOW

-------------------------------------------------------------------------------

Addendum: 19 at 1149 by Preeti Brand CM

-------------------------------------------------------------------------------

DC PLANNING:

CONTINUE HEPARIN DRIP FOR HIGH TROPONIN. AWAITING FOR CARDIO CONSULT AT THIS TIME CM TO 
FOLLOW

## 2019-12-19 NOTE — NUR
Patient will be admitted to care of [facility]. Admited to TELE.  Will go to room 
120 B. Belongings list completed.  Report to RICHARD MCDONNELL .

## 2019-12-19 NOTE — NUR
PT BACK FROM CT HEAD, IVF OF NS AT 20ML/H STARTED, WILL WAIT FOR RESULTS FOR CT HEAD AND PTT 
BEFORE I CAN START PT ON HEPARIN DRIP, SANDWICH PROVIDED, CONSUMED 100%, ALL NEEDS ATTENDED.

## 2019-12-19 NOTE — NUR
Current HR = 140. Administered Digoxin 0.5mg IVP over 5min. Pt in high fowlers in bed, 
aaox4, respirations even & nonlabored on O2 @ 2Lpm via n/c. Pt denies any discomfort at this 
time. Pt's brother at bedside visiting pt. Call light within reach. Will cont to monitor.

## 2019-12-19 NOTE — NUR
CALLED AFTER HOURS PHARMACY TO FOLLOW UP HEPARIN DRIP, TALKED TO SHANEL PHARMACIST, WILL WAIT 
FOR MORNING PHARMACY TO CALCULATE THE DOSAGE, CHARGE RICHARD OSUNA MADE AWARE.

## 2019-12-19 NOTE — NUR
LATEST PTT 39.7 HEPARIN PROTOCOL FOLLOWED. GIVEN HEPARIN BOLUS 3600 UNITS IVP. THEN THE DRIP 
TO INCREASE  UNITS /HR.  NEXT PTT WILL BE 0540 AM. WILL CONTINUE TO MONITOR .

## 2019-12-20 VITALS — DIASTOLIC BLOOD PRESSURE: 62 MMHG | SYSTOLIC BLOOD PRESSURE: 115 MMHG

## 2019-12-20 VITALS — DIASTOLIC BLOOD PRESSURE: 73 MMHG | SYSTOLIC BLOOD PRESSURE: 122 MMHG

## 2019-12-20 VITALS — SYSTOLIC BLOOD PRESSURE: 123 MMHG | DIASTOLIC BLOOD PRESSURE: 68 MMHG

## 2019-12-20 RX ADMIN — FUROSEMIDE SCH MG: 10 INJECTION, SOLUTION INTRAMUSCULAR; INTRAVENOUS at 09:29

## 2019-12-20 RX ADMIN — IPRATROPIUM BROMIDE AND ALBUTEROL SULFATE SCH ML: .5; 3 SOLUTION RESPIRATORY (INHALATION) at 07:53

## 2019-12-20 RX ADMIN — Medication SCH MG: at 09:29

## 2019-12-20 RX ADMIN — DIGOXIN SCH MG: 0.25 INJECTION INTRAMUSCULAR; INTRAVENOUS at 05:55

## 2019-12-20 RX ADMIN — HEPARIN SODIUM SCH MLS/HR: 5000 INJECTION, SOLUTION INTRAVENOUS at 02:43

## 2019-12-20 RX ADMIN — SODIUM CHLORIDE SCH MLS/HR: 9 INJECTION, SOLUTION INTRAVENOUS at 03:59

## 2019-12-20 RX ADMIN — HEPARIN SODIUM SCH MLS/HR: 5000 INJECTION, SOLUTION INTRAVENOUS at 06:45

## 2019-12-20 RX ADMIN — LISINOPRIL SCH MG: 10 TABLET ORAL at 09:29

## 2019-12-20 RX ADMIN — Medication SCH MG: at 09:28

## 2019-12-20 NOTE — NUR
AT THE BED SIDE WITH DR BUNN TO EDUCATE PATIENT, PATIENT VERBALIZED UNDERSTANDING OF THE 
RISK. PATIENT EDUCATED ON SIGNS OF BLEEDING AND WHEN TO GO TO THE NEAREST HOSPITAL. AMA FORM 
WAS SIGNED BY PATIENT. PATIENT IS ACCOMPANIED BY A FEMALE TO TAKE HIM HOME.

## 2019-12-20 NOTE — NUR
LATEST PTT 35.2 PROTOCOL FOLLOWED. THE RATE OF HEPARIN DRIP 1930 UNITS/HR. NEXT PTT DUE AT 
1300 TODAY. WILL ENDORSE TO AM NURSE.

## 2019-12-20 NOTE — NUR
PATIENT VERBALIZING WANTS TO LEAVE REGARDLESS OF DOCTORS RECOMMENDATIONS. DR BUNN 
NOTIFIED, PATIENT WAS EDUCATED ON THE RISK OF LEAVING AGAINST MEDICAL ADVICE. TUNDE SINCE HE 
IS ON HEPARIN DRIP, PATIENT STATES HE "DOESN'T NOT CARE AND IS LEAVING".

## 2019-12-20 NOTE — NUR
PATIENTS IV WAS DISCONTINUED, NO BLEEDING NOTED, LUMEN INTACT, NO SWELLING OR REDNESS NOTED. 
ID BAND WAS ALREADY RIPPED OFF BY PATIENT. PATIENT GATHERED HIS BELONGING AND WAS ESCORTED 
VIA WHEELCHAIR TO THE LOBBY. PATIENT AMA FROM THE HOSPITAL AT THIS TIME.

## 2019-12-20 NOTE — NUR
Note:



SW attempted to complete screening with patient, but patient was discharged. No further 
needs identified.

## 2021-01-13 ENCOUNTER — HOSPITAL ENCOUNTER (EMERGENCY)
Dept: HOSPITAL 26 - MED | Age: 61
LOS: 1 days | Discharge: HOME | End: 2021-01-14
Payer: COMMERCIAL

## 2021-01-13 VITALS — BODY MASS INDEX: 36.82 KG/M2 | HEIGHT: 71 IN | WEIGHT: 263 LBS

## 2021-01-13 VITALS — DIASTOLIC BLOOD PRESSURE: 60 MMHG | SYSTOLIC BLOOD PRESSURE: 142 MMHG

## 2021-01-13 DIAGNOSIS — J18.9: ICD-10-CM

## 2021-01-13 DIAGNOSIS — J96.20: ICD-10-CM

## 2021-01-13 DIAGNOSIS — U07.1: Primary | ICD-10-CM

## 2021-01-13 LAB
ALBUMIN FLD-MCNC: 2.7 G/DL (ref 3.4–5)
ANION GAP SERPL CALCULATED.3IONS-SCNC: 13.4 MMOL/L (ref 8–16)
AST SERPL-CCNC: 82 U/L (ref 15–37)
BILIRUB SERPL-MCNC: 1.2 MG/DL (ref 0–1)
BUN SERPL-MCNC: 35 MG/DL (ref 7–18)
CHLORIDE SERPL-SCNC: 92 MMOL/L (ref 98–107)
CO2 SERPL-SCNC: 27.8 MMOL/L (ref 21–32)
CREAT SERPL-MCNC: 1.6 MG/DL (ref 0.6–1.3)
GFR SERPL CREATININE-BSD FRML MDRD: 57 ML/MIN (ref 90–?)
GLUCOSE SERPL-MCNC: 139 MG/DL (ref 74–106)
POTASSIUM SERPL-SCNC: 4.2 MMOL/L (ref 3.5–5.1)
SODIUM SERPL-SCNC: 129 MMOL/L (ref 136–145)

## 2021-01-13 PROCEDURE — 87804 INFLUENZA ASSAY W/OPTIC: CPT

## 2021-01-13 PROCEDURE — 85379 FIBRIN DEGRADATION QUANT: CPT

## 2021-01-13 PROCEDURE — 83880 ASSAY OF NATRIURETIC PEPTIDE: CPT

## 2021-01-13 PROCEDURE — 99285 EMERGENCY DEPT VISIT HI MDM: CPT

## 2021-01-13 PROCEDURE — 83625 ASSAY OF LDH ENZYMES: CPT

## 2021-01-13 PROCEDURE — 36415 COLL VENOUS BLD VENIPUNCTURE: CPT

## 2021-01-13 PROCEDURE — 82728 ASSAY OF FERRITIN: CPT

## 2021-01-13 PROCEDURE — 83605 ASSAY OF LACTIC ACID: CPT

## 2021-01-13 PROCEDURE — G0482 DRUG TEST DEF 15-21 CLASSES: HCPCS

## 2021-01-13 PROCEDURE — 96361 HYDRATE IV INFUSION ADD-ON: CPT

## 2021-01-13 PROCEDURE — 80053 COMPREHEN METABOLIC PANEL: CPT

## 2021-01-13 PROCEDURE — 71045 X-RAY EXAM CHEST 1 VIEW: CPT

## 2021-01-13 PROCEDURE — 84484 ASSAY OF TROPONIN QUANT: CPT

## 2021-01-13 PROCEDURE — 87426 SARSCOV CORONAVIRUS AG IA: CPT

## 2021-01-13 PROCEDURE — 93005 ELECTROCARDIOGRAM TRACING: CPT

## 2021-01-13 PROCEDURE — 85384 FIBRINOGEN ACTIVITY: CPT

## 2021-01-13 PROCEDURE — 86140 C-REACTIVE PROTEIN: CPT

## 2021-01-13 PROCEDURE — 85025 COMPLETE CBC W/AUTO DIFF WBC: CPT

## 2021-01-13 PROCEDURE — 96365 THER/PROPH/DIAG IV INF INIT: CPT

## 2021-01-13 PROCEDURE — 87040 BLOOD CULTURE FOR BACTERIA: CPT

## 2021-01-13 PROCEDURE — 96375 TX/PRO/DX INJ NEW DRUG ADDON: CPT

## 2021-01-13 NOTE — NUR
61 Y/O MALE BIBA FROM [facility] FOR C/O OF ANXIETY. ON PRESENTATION WAS NOTED WITH 
TACHYCARDIA HR , RR OF 39 AND NON-PRODUCTIVE HACKING COUGH. AFREBILE, 
STATES SYPTOMS STARTED X 2 DAYS AGO. NOTED WITH BLACKENED BILAT LEGS NO EDEMA 
NOTED. PICC LINE TO R UPPER ARM. 



PMHX: CHF, COPD, MI X 7 DAYS IN PAST

NKA

## 2021-01-14 ENCOUNTER — HOSPITAL ENCOUNTER (INPATIENT)
Dept: HOSPITAL 26 - MED | Age: 61
LOS: 13 days | DRG: 130 | End: 2021-01-27
Attending: INTERNAL MEDICINE | Admitting: INTERNAL MEDICINE
Payer: COMMERCIAL

## 2021-01-14 VITALS — HEIGHT: 70 IN | WEIGHT: 245 LBS | BODY MASS INDEX: 35.07 KG/M2

## 2021-01-14 VITALS — DIASTOLIC BLOOD PRESSURE: 82 MMHG | SYSTOLIC BLOOD PRESSURE: 110 MMHG

## 2021-01-14 VITALS — SYSTOLIC BLOOD PRESSURE: 124 MMHG | DIASTOLIC BLOOD PRESSURE: 82 MMHG

## 2021-01-14 VITALS — SYSTOLIC BLOOD PRESSURE: 132 MMHG | DIASTOLIC BLOOD PRESSURE: 74 MMHG

## 2021-01-14 VITALS — SYSTOLIC BLOOD PRESSURE: 111 MMHG | DIASTOLIC BLOOD PRESSURE: 85 MMHG

## 2021-01-14 DIAGNOSIS — U07.1: Primary | ICD-10-CM

## 2021-01-14 DIAGNOSIS — J12.82: ICD-10-CM

## 2021-01-14 DIAGNOSIS — I25.10: ICD-10-CM

## 2021-01-14 DIAGNOSIS — J44.0: ICD-10-CM

## 2021-01-14 DIAGNOSIS — Z86.14: ICD-10-CM

## 2021-01-14 DIAGNOSIS — Z83.3: ICD-10-CM

## 2021-01-14 DIAGNOSIS — E87.0: ICD-10-CM

## 2021-01-14 DIAGNOSIS — Z87.891: ICD-10-CM

## 2021-01-14 DIAGNOSIS — I46.9: ICD-10-CM

## 2021-01-14 DIAGNOSIS — N18.9: ICD-10-CM

## 2021-01-14 DIAGNOSIS — J80: ICD-10-CM

## 2021-01-14 DIAGNOSIS — I21.4: ICD-10-CM

## 2021-01-14 DIAGNOSIS — I48.91: ICD-10-CM

## 2021-01-14 DIAGNOSIS — J44.1: ICD-10-CM

## 2021-01-14 DIAGNOSIS — I48.92: ICD-10-CM

## 2021-01-14 DIAGNOSIS — Z82.49: ICD-10-CM

## 2021-01-14 DIAGNOSIS — E66.9: ICD-10-CM

## 2021-01-14 DIAGNOSIS — G93.1: ICD-10-CM

## 2021-01-14 DIAGNOSIS — F41.9: ICD-10-CM

## 2021-01-14 DIAGNOSIS — I13.0: ICD-10-CM

## 2021-01-14 DIAGNOSIS — E11.22: ICD-10-CM

## 2021-01-14 DIAGNOSIS — R74.01: ICD-10-CM

## 2021-01-14 DIAGNOSIS — I50.22: ICD-10-CM

## 2021-01-14 DIAGNOSIS — N17.9: ICD-10-CM

## 2021-01-14 DIAGNOSIS — Z51.5: ICD-10-CM

## 2021-01-14 LAB
BASOPHILS # BLD AUTO: 0 K/UL (ref 0–0.22)
BASOPHILS NFR BLD AUTO: 0.4 % (ref 0–2)
C-REACTIVE PROTEIN QUANT: 12.9 MG/DL (ref 0–0.9)
DEPRECATED D DIMER PPP-ACNC: 505 NG/ML (ref 0–400)
EOSINOPHIL # BLD AUTO: 0 K/UL (ref 0–0.4)
EOSINOPHIL NFR BLD AUTO: 0 % (ref 0–4)
ERYTHROCYTE [DISTWIDTH] IN BLOOD BY AUTOMATED COUNT: 15.4 % (ref 11.6–13.7)
FIBRINOGEN PPP-MCNC: 456 MG/DL (ref 200–400)
HCT VFR BLD AUTO: 37.4 % (ref 36–52)
HGB BLD-MCNC: 12.3 G/DL (ref 12–18)
LYMPHOCYTES # BLD AUTO: 0.7 K/UL (ref 2–11.5)
LYMPHOCYTES NFR BLD AUTO: 11.4 % (ref 20.5–51.1)
MCH RBC QN AUTO: 29 PG (ref 27–31)
MCHC RBC AUTO-ENTMCNC: 33 G/DL (ref 33–37)
MCV RBC AUTO: 88.6 FL (ref 80–94)
MONOCYTES # BLD AUTO: 0.8 K/UL (ref 0.8–1)
MONOCYTES NFR BLD AUTO: 13.3 % (ref 1.7–9.3)
NEUTROPHILS # BLD AUTO: 4.5 K/UL (ref 1.8–7.7)
NEUTROPHILS NFR BLD AUTO: 74.9 % (ref 42.2–75.2)
PLATELET # BLD AUTO: 148 K/UL (ref 140–450)
RBC # BLD AUTO: 4.22 MIL/UL (ref 4.2–6.1)
WBC # BLD AUTO: 6 K/UL (ref 4.8–10.8)

## 2021-01-14 PROCEDURE — C9113 INJ PANTOPRAZOLE SODIUM, VIA: HCPCS

## 2021-01-14 NOTE — NUR
Patient discharged with v/s stable. Written and verbal after care instructions 
given and explained. 

Patient alert, oriented and verbalized understanding of instructions. Ambulance 
Transport with to nursing home. All questions addressed prior to discharge. ID 
band removed. Patient advised to follow up with PMD. Rx of Prednisone/Ventolin, 
Azithromycin, Augmentin/Azithromycin, Prednisone/Azithromycin given. Patient 
educated on indication of medication including possible reaction and side 
effects. Opportunity to ask questions provided and answered.

## 2021-01-14 NOTE — NUR
NOTIFIED ER MD DR. DIAZ OF PT C/O SEVERE ANXIETY YELLING AT THIS TIME AND 
ATTEMPTING TO GET OUT OF BED. DR. DIAZ GAVE NEW ORDER FOR BENADRYL.

## 2021-01-14 NOTE — NUR
59 Y/O MALE BIBA ON 1/13 [place] [facility] [place] WITH C/C OF ANXIETY AND TACHYCARDIA HR OF 
128, RR OF 38 AND NON-PRODUCTIVE HACKING COUGH.  PT DISCHARGED THIS MORNING TO 
[facility] [place] WITH A POSITIVE COVID DX PT RETURNED THIS AFTERNOON FOR C/C OF "FLUCTUATING 
O2 SATURATIONS." PT PRESENTS AT 96% ROOM AIR, EQUAL CHEST RISE AND FALL. PT 
ASLEEP WITH NO ACUTE DISTRESS NOTED AT THIS TIME. PT PLACED ON CARDIAC MONITOR. 
BED LOCKED AND IN LOWEST POSITION. SIDE RAILS X2. 



PMHX: CHF, COPD, MI X 7 DAYS IN PAST

NKA

## 2021-01-14 NOTE — NUR
PT ASLEEP IN BED. EQUAL CHEST RISE AND FALL. BED LOCKED AND IN LOWEST POSITION. 
CARDIAC MONITOR IN PLACE. SIDE RAILS X1.

## 2021-01-14 NOTE — NUR
Patient will be admitted to care of Dr. Acevedo. Admited to TELE.  Will go to 
room 116. Belongings list completed.  Report to Milagro RAMOS.

## 2021-01-14 NOTE — NUR
PT WAS NOTED CONTINUOUSLY SPITTING ON HIMSELF WAS OFFERED A CUP TO SPIT IN OR A 
VOMIT BAG. PT REFUSED STATING, "I FUCKEN HATE THIS SHIT, I JUST WANT TO GO HOME 
LET ME GO". EXPLAINED TO PT THAT ARRANGEMENTS WERE BEING MADE AT THIS TIME FOR 
TRANSPORT. PT BECAME UPSET UNWILLING TO COOPERATE. PT WAS CHANGED TO A CLEAN 
NEW GOWN. CHANGED BED SHEETS AND PROVIDED WATER.

## 2021-01-14 NOTE — NUR
PT WAS GIVEN ICE CHIPS AS PER REQUEST. PT WAS OBSERVED SPITTING ON HIS SHIRT 
ADVICED NOT TO AND WAS GIVEN A VOMITING BAG PT REFUSED AND THREW BAG TO THE 
FLOOR.

## 2021-01-14 NOTE — NUR
PT ASLEEP IN HIGH-FOWLERS POSITION. EQUAL CHEST RISE AND FALL, O2 SATURATION 
98% ON 2L/MIN. CARDIAC MONITOR IN PLACE. BED LOCKED IN LOWEST POSITION, SIDE 
RAILS X 2, CALL LIGHT IN REACH

## 2021-01-14 NOTE — NUR
PT REPORT GIVEN TO RICHARD MOON AT [facility] [place]. MADE AWARE THAT TRANSPORT IS HERE TO 
TAKE PT TO FACILITY.

## 2021-01-14 NOTE — NUR
NOTIFIED DR. DIAZ THAT PT CONTINUES TO BE ANXIOUS/RESTLESS. YELLING IN ED 
NOT FOLLOWING REDIRRECTION AND ATTEMPTING TO GET OUT OF BED. ER MD GAVE NEW 
ORDER FOR KETALAR 0.5 ML = 50 MG IVP.

## 2021-01-14 NOTE — NUR
NOTIFIED DR. DIAZ THAT PT'S 1ST TROPONIN WAS 0.398 AND THE 2ND TROPONIN WAS 
0.446. INCREASED FROM INITIAL VALUE PER ER MD, "HIS TROPONIN IS ALWAYS HIGH 
HE'S OKAY HE CAN GO HOME".

## 2021-01-14 NOTE — NUR
NOTIFIED ER MD THAT PT HAS NOT BEEN ABLE TO PROVIDE URINE AND HAS PENDING D/C 
AT THIS, DR. DIAZ STATED, "THAT'S FINE WE DONT NEED THE URINE, HE'S GOOD TO 
GO JUST FORGET THE URINE".

## 2021-01-14 NOTE — NUR
PT LAYING IN BED IN NO ACUTE DISTRESS NOTED, BREATHING EVEN AND UNLABORED. VSS, 
ELEVATED HR . CONTINUES ON BEDSIDE MONITORING.

## 2021-01-14 NOTE — NUR
GAVE UPDATE TO PTS SISTER-IN-[place] TJ. MADE AWARE THAT PT WILL BE TRANSPORTED 
BACK TO [facility] AT 1000.

## 2021-01-15 VITALS — SYSTOLIC BLOOD PRESSURE: 142 MMHG | DIASTOLIC BLOOD PRESSURE: 98 MMHG

## 2021-01-15 VITALS — DIASTOLIC BLOOD PRESSURE: 89 MMHG | SYSTOLIC BLOOD PRESSURE: 140 MMHG

## 2021-01-15 VITALS — DIASTOLIC BLOOD PRESSURE: 108 MMHG | SYSTOLIC BLOOD PRESSURE: 131 MMHG

## 2021-01-15 VITALS — DIASTOLIC BLOOD PRESSURE: 82 MMHG | SYSTOLIC BLOOD PRESSURE: 127 MMHG

## 2021-01-15 VITALS — DIASTOLIC BLOOD PRESSURE: 82 MMHG | SYSTOLIC BLOOD PRESSURE: 137 MMHG

## 2021-01-15 VITALS — DIASTOLIC BLOOD PRESSURE: 98 MMHG | SYSTOLIC BLOOD PRESSURE: 122 MMHG

## 2021-01-15 VITALS — DIASTOLIC BLOOD PRESSURE: 68 MMHG | SYSTOLIC BLOOD PRESSURE: 122 MMHG

## 2021-01-15 LAB
ALBUMIN FLD-MCNC: 2.7 G/DL (ref 3.4–5)
ANION GAP SERPL CALCULATED.3IONS-SCNC: 15.5 MMOL/L (ref 8–16)
AST SERPL-CCNC: 146 U/L (ref 15–37)
BASOPHILS # BLD AUTO: 0 K/UL (ref 0–0.22)
BASOPHILS NFR BLD AUTO: 0 % (ref 0–2)
BILIRUB SERPL-MCNC: 1.4 MG/DL (ref 0–1)
BUN SERPL-MCNC: 42 MG/DL (ref 7–18)
CHLORIDE SERPL-SCNC: 102 MMOL/L (ref 98–107)
CO2 SERPL-SCNC: 25.9 MMOL/L (ref 21–32)
CREAT SERPL-MCNC: 1.7 MG/DL (ref 0.6–1.3)
EOSINOPHIL # BLD AUTO: 0 K/UL (ref 0–0.4)
EOSINOPHIL NFR BLD AUTO: 0 % (ref 0–4)
ERYTHROCYTE [DISTWIDTH] IN BLOOD BY AUTOMATED COUNT: 15.3 % (ref 11.6–13.7)
FERRITIN SERPL-MCNC: 607 NG/ML (ref 30–400)
GFR SERPL CREATININE-BSD FRML MDRD: 53 ML/MIN (ref 90–?)
GLUCOSE SERPL-MCNC: 154 MG/DL (ref 74–106)
HCT VFR BLD AUTO: 39.4 % (ref 36–52)
HGB BLD-MCNC: 12.8 G/DL (ref 12–18)
LDH SERPL-CCNC: 447 IU/L (ref 0–214)
LYMPHOCYTES # BLD AUTO: 1.1 K/UL (ref 2–11.5)
LYMPHOCYTES NFR BLD AUTO: 10.6 % (ref 20.5–51.1)
MCH RBC QN AUTO: 29 PG (ref 27–31)
MCHC RBC AUTO-ENTMCNC: 33 G/DL (ref 33–37)
MCV RBC AUTO: 89.1 FL (ref 80–94)
MONOCYTES # BLD AUTO: 0.9 K/UL (ref 0.8–1)
MONOCYTES NFR BLD AUTO: 8.6 % (ref 1.7–9.3)
NEUTROPHILS # BLD AUTO: 8.1 K/UL (ref 1.8–7.7)
NEUTROPHILS NFR BLD AUTO: 80.8 % (ref 42.2–75.2)
PLATELET # BLD AUTO: 233 K/UL (ref 140–450)
POTASSIUM SERPL-SCNC: 5.4 MMOL/L (ref 3.5–5.1)
RBC # BLD AUTO: 4.43 MIL/UL (ref 4.2–6.1)
SODIUM SERPL-SCNC: 138 MMOL/L (ref 136–145)
WBC # BLD AUTO: 10 K/UL (ref 4.8–10.8)

## 2021-01-15 RX ADMIN — NITROGLYCERIN SCH GM: 20 OINTMENT TOPICAL at 18:00

## 2021-01-15 RX ADMIN — APIXABAN SCH MG: 2.5 TABLET, FILM COATED ORAL at 21:00

## 2021-01-15 RX ADMIN — Medication SCH MG: at 21:00

## 2021-01-15 RX ADMIN — ALBUTEROL SULFATE SCH GM: 90 AEROSOL, METERED RESPIRATORY (INHALATION) at 18:00

## 2021-01-15 RX ADMIN — DEXAMETHASONE SODIUM PHOSPHATE SCH MG: 4 INJECTION, SOLUTION INTRAMUSCULAR; INTRAVENOUS at 14:16

## 2021-01-15 RX ADMIN — ALBUTEROL SULFATE SCH GM: 90 AEROSOL, METERED RESPIRATORY (INHALATION) at 12:00

## 2021-01-15 NOTE — NUR
DC PLANIN YRS OLD MALE PATIENT WAS [place] [facility] [place] DIAGNOSIS OF COVID AND PNEUMONIA. PT 
TESTED POSITIVE AT [facility]. ON O2 6L/NC SATING 100% . STARTED ON COVID TREATMENT REMDESEVIR 
ROCEPHIN AND AZITHROMYCIN IV ABX. CONSULTED WITH ID . DC PLAN TO GO BACK TO [facility] WHEN STABLE 
CM TO FOLLOW. 

-------------------------------------------------------------------------------

Addendum: 21 at 1521 by Meenu Raza CM

-------------------------------------------------------------------------------

TRANSFERRED TO ICU AT 0350 2021.  ORALLY INTUBATED TO VENT, FIO2 50%, PEEP 5, O2 SAT 
98%.  SEDATED WITH PROPOFOL.  PER DR. SANDERS'S NOTES, CODE STATUS CHANGED TO DNR PER 
FAMILY'S REQUEST.

-------------------------------------------------------------------------------

Addendum: 21 at 1109 by Meenu Raza CM

-------------------------------------------------------------------------------

POC DISCUSSED WITH DR CARO.  HE STATED WILL CONTINUE TO MONITOR PATIENT.

-------------------------------------------------------------------------------

Addendum: 21 at 1554 by Meenu Raza CM

-------------------------------------------------------------------------------

SEEN BY BACILIO - FAMILY IS CONSIDERING COMFORT CARE AND TERMINAL WEAN FOR TOMORROW.  
CONTACTED PATIENT'S BROTHER AZEB HERNANDEZ -[phone], NO ANSWER.  UNABLE TO LEAVE 
VOICEMAIL.  CONTACTED PATIENT'S SISTER IN [place] [phone] TO DISCUSS TERMINAL 
EXTUBATION TOMORROW AND SHE CONFIRMED.  SHE ALSO STATED THAT HER  AZEB IS AT WORK 
THAT IS WHY HE IS NOT ABLE TO RECEIVE ANY CALLS AT THIS TIME.  SHE STATED THAT THEY WANTED 
THE PATIENT TO BE ON COMFORT MEASURES AND TERMINAL WEANING FOR TOMORROW AT 0900.  SHE ALSO 
STATED THEY WILL BE HERE TO SEE THE PATIENT PRIOR TO EXTUBATION.  HARLAN MADE AWARE.

## 2021-01-15 NOTE — NUR
AT 1615 PATIENT WAS FOUND ON THE FLOOR. VITAL SIGNS CHECKED. NO INJURIES NOTED. PER PATIENT, 
HE WAS TRYING TO WALK OUT OF HIS ROOM AND TRYING TO LEAVE THE HOSPITAL. FAMILY MEMBER PT'S 
SISTER IN [place] WAS INFORMED [phone]. PER PATO, PATIENT HAS NO MONEY ON HIM, PT'S 
WALLET AND PH[place] WERE [place] [facility]. SHE IS GOING TO PICK THEM UP FOR THE PATIENT FROM THE 
[facility].

## 2021-01-15 NOTE — NUR
PATIENT STATED THAT HE IS UPSET AND WANTED TO LEAVE. PT CAME FROM [facility] ROOM. INFORMED CARLOS. 
PER CARLOS, PATIENT CAME FROM [facility] [place]. WILL FOLLOW UP.

## 2021-01-15 NOTE — NUR
LAB DRAWN FOR THE PATIENT BY THE PHLEBOTOMIST. PATIENT STATED THAT HE'S SCARED AND PATIENT 
IS VERY ANXIOUS. PATIENT REFUSED ATIVAN AGAIN. PATIENT STILL WANTED TO LEAVE THE HOSPITAL 
BUT NOWHERE TO GO. AND HIS PHYSICAL NOT ALLOWED HIM TO GO. WILL ENDORSE PATIENT TO NIGHT 
SHIFT RN FOR CONTINUITY OF CARE.

## 2021-01-15 NOTE — NUR
PATIENT HAS BEEN SCREENED AND CATEGORIZED AS MODERATE NUTRITION RISK. PATIENT WILL BE SEEN 
WITHIN 3-5 DAYS OF ADMISSION.



01/17/21 01/19/21



DANIEL SAVAGE RD

## 2021-01-15 NOTE — NUR
SPOKE WITH PATIENT'S SISTER IN [place] , PATIENT IS HOMELESS DUE TO THE 
COVID, PT WAS IN [place] AND THEN TRANSFERR[place] [facility] PER PATO, PATIENT IS VERY ANGRY PERSON 
AND EASILY GET MAD, AND PT TOOK DRUGS. ALSO MENTIONED ABOUT PATIENT'S BELONGINGS, WALLET, 
PER PATO, SHE BELIEVES THAT THE WALLET STILL IN [facility]. UPDATED THAT PATIENT'S CONDITION AND 
PATO STATED THAT THEY WERE TRYING TO HELP THE PATIENT AND SETTLE DOWN.

## 2021-01-15 NOTE — NUR
NITROGLYCERIN GIVEN FOR CHEST PAIN PER PATIENT'S REQUEST. EDUCATION PROVIDED. WILL CONTINUE 
TO MONITOR

## 2021-01-15 NOTE — NUR
PT IS SCREAMING, YELLING AND CURSING, LANGUAGE ABUSE. INFORMED PATIENT HE NEED TO CALM DOWN. 
OFFERED ATIVAN, HOWEVER, PATIENT REFUSED AGAIN. CALLED HOUSE SUPERVISOR WITH NO ANSWER.

## 2021-01-15 NOTE — NUR
PATIENT RECEIVED IN BED ALERT AND RESPONSIVE. PATIENT VERBALIZED NEEDS AND CONCERNS TO RN. 
DISCUSSED WITH PATIENT RN PLAN OF CARE. DISCUSSED FALL AND SAFETY INTERVENTIONS, MEDICATION 
REGIMEN AND RN PLAN OF CARE. PATIENT RECEPTIVE TO RN PLAN OF CARE. VSS. NO ACUTE DISTRESS 
NOTED.

## 2021-01-15 NOTE — NUR
PATIENT SIGNED AMA AND STATED THAT HE WANTED TO LEAVE THE HOSPITAL. ASKED PATIENT WHERE HE 
IS GOING TO, PATIENT STATED THAT HE WOULD GO TO ANOTHER HOSPITAL. CALLED PATIENT'S SISTER IN 
[place]. SHE STATED THAT SHE WOULD  THE PH[place] AND [place] THE [place] [facility] [place]

## 2021-01-15 NOTE — NUR
PATIENT IS VERY ANXIOUS, PROVIDED ATIVAN, PATIENT REFUSED. INFORMED PATIENT THAT ATIVAN IS 
GOING TO HELP HIM TO CALM DOWN. PT STATED THAT HE IS VERY SCARED AND HE WANTED TO LEAVE. 
CALLED HOUSE SUPERVISOR EXTENSION, WITH NO ANSWER. CALLED  CARLOS WITH NO ANSWER 
AS WELL.

## 2021-01-15 NOTE — NUR
SOCIAL WORK NOTE:





Patient's Orientation 

Unable To Assess

Information Provided By 

VASU - JACOB

Comments 

SW WAS UNABLE TO MEET PATIENT AT BEDSIDE. KALYAN COMPLETED ASSESSMENT WITH 

VASU FROM [facility].

, Realtionship and Phone Number 

AZEB HERNANDEZ

BROTHER

[phone]



TJ WYNN

FAMILY

[phone]

[facility] Power of  

No

Does Patient Have a POLST 

No

Identifying Problems 

No Social Work Triggers

Is A Social Work Consult Needed 

No

Mandate Report Filed 

No

Explanation Of Identifying Problems 

PATIENT IS A 60-YEAR-OLD MALE ADMITTED FOR COVID AND PNA. PATIENT HAS PMHX 

OF COPD, CHF, AND HYPERTENSION.

Admitted From 

Skilled Nursing Facility

[facility] 

[facility] - [phone]

Pre-Admission Level Of Functioning Status 

Assist With ADL

Prior Resources/Services Used In Last 12 Months 

SNF Rehab/Skilled

Prior Resources/Service Comments 

PATIENT IS SKILLED AND IS ON BED HOLD.

Prior DME 

No Prior DME Used

Dialysis Comments 

N/A

Patient Had Caregiver 

No

Home Support 

No Caregiver Issues

Financial Issues 

No Known Financial Issue

Referral To The Financial Counselor Needed 

No

Factors/Needs 

No D/C Needs Identified

Discharge Plan Comments 

TENTATIVE DISCHARGE PLAN IS FOR PATIENT TO RETURN TO [facility].

DC Plan Status 

Initiated

## 2021-01-15 NOTE — NUR
PATIENT SITTING IN THE CHAIR. BILATERAL FOOT ELEVATED. PURPLE ON BILATERAL LOWER LEGS NOTED. 
MIDLINE ON RIGHT UPPER ARM NOTED. PHLEBOTOMIST DRAWING THE BLOOD. ON 6L OXYGEN VIA NC. NO 
ACUTE DISTRESS NOTED. SAFETY MEASURES IN PLACE, WILL CONTINUE TO MONITOR.

## 2021-01-16 VITALS — SYSTOLIC BLOOD PRESSURE: 105 MMHG | DIASTOLIC BLOOD PRESSURE: 72 MMHG

## 2021-01-16 VITALS — DIASTOLIC BLOOD PRESSURE: 74 MMHG | SYSTOLIC BLOOD PRESSURE: 97 MMHG

## 2021-01-16 VITALS — SYSTOLIC BLOOD PRESSURE: 132 MMHG | DIASTOLIC BLOOD PRESSURE: 81 MMHG

## 2021-01-16 VITALS — DIASTOLIC BLOOD PRESSURE: 84 MMHG | SYSTOLIC BLOOD PRESSURE: 122 MMHG

## 2021-01-16 VITALS — SYSTOLIC BLOOD PRESSURE: 122 MMHG | DIASTOLIC BLOOD PRESSURE: 98 MMHG

## 2021-01-16 VITALS — SYSTOLIC BLOOD PRESSURE: 126 MMHG | DIASTOLIC BLOOD PRESSURE: 76 MMHG

## 2021-01-16 LAB
ALBUMIN FLD-MCNC: 2.6 G/DL (ref 3.4–5)
ALBUMIN FLD-MCNC: 2.6 G/DL (ref 3.4–5)
ANION GAP SERPL CALCULATED.3IONS-SCNC: 20.3 MMOL/L (ref 8–16)
AST SERPL-CCNC: 301 U/L (ref 15–37)
AST SERPL-CCNC: 307 U/L (ref 15–37)
BASOPHILS # BLD AUTO: 0 K/UL (ref 0–0.22)
BASOPHILS NFR BLD AUTO: 0.1 % (ref 0–2)
BILIRUB DIRECT SERPL-MCNC: 1.1 MG/DL (ref 0–0.3)
BILIRUB SERPL-MCNC: 1.8 MG/DL (ref 0–1)
BILIRUB SERPL-MCNC: 1.9 MG/DL (ref 0–1)
BUN SERPL-MCNC: 50 MG/DL (ref 7–18)
CHLORIDE SERPL-SCNC: 90 MMOL/L (ref 98–107)
CO2 SERPL-SCNC: 20.1 MMOL/L (ref 21–32)
CREAT SERPL-MCNC: 1.8 MG/DL (ref 0.6–1.3)
EOSINOPHIL # BLD AUTO: 0 K/UL (ref 0–0.4)
EOSINOPHIL NFR BLD AUTO: 0 % (ref 0–4)
ERYTHROCYTE [DISTWIDTH] IN BLOOD BY AUTOMATED COUNT: 15.5 % (ref 11.6–13.7)
GFR SERPL CREATININE-BSD FRML MDRD: 50 ML/MIN (ref 90–?)
GLUCOSE SERPL-MCNC: 108 MG/DL (ref 74–106)
HCT VFR BLD AUTO: 37 % (ref 36–52)
HGB BLD-MCNC: 12 G/DL (ref 12–18)
LYMPHOCYTES # BLD AUTO: 0.9 K/UL (ref 2–11.5)
LYMPHOCYTES NFR BLD AUTO: 8.6 % (ref 20.5–51.1)
MCH RBC QN AUTO: 29 PG (ref 27–31)
MCHC RBC AUTO-ENTMCNC: 33 G/DL (ref 33–37)
MCV RBC AUTO: 90.2 FL (ref 80–94)
MONOCYTES # BLD AUTO: 0.7 K/UL (ref 0.8–1)
MONOCYTES NFR BLD AUTO: 6.5 % (ref 1.7–9.3)
NEUTROPHILS # BLD AUTO: 8.6 K/UL (ref 1.8–7.7)
NEUTROPHILS NFR BLD AUTO: 84.8 % (ref 42.2–75.2)
PLATELET # BLD AUTO: 244 K/UL (ref 140–450)
POTASSIUM SERPL-SCNC: 5.4 MMOL/L (ref 3.5–5.1)
RBC # BLD AUTO: 4.1 MIL/UL (ref 4.2–6.1)
SODIUM SERPL-SCNC: 125 MMOL/L (ref 136–145)
WBC # BLD AUTO: 10.1 K/UL (ref 4.8–10.8)

## 2021-01-16 RX ADMIN — Medication SCH MG: at 09:47

## 2021-01-16 RX ADMIN — ALBUTEROL SULFATE SCH GM: 90 AEROSOL, METERED RESPIRATORY (INHALATION) at 00:00

## 2021-01-16 RX ADMIN — APIXABAN SCH MG: 2.5 TABLET, FILM COATED ORAL at 09:45

## 2021-01-16 RX ADMIN — NITROGLYCERIN SCH GM: 20 OINTMENT TOPICAL at 00:01

## 2021-01-16 RX ADMIN — ALBUTEROL SULFATE SCH GM: 90 AEROSOL, METERED RESPIRATORY (INHALATION) at 12:00

## 2021-01-16 RX ADMIN — NITROGLYCERIN SCH GM: 20 OINTMENT TOPICAL at 06:35

## 2021-01-16 RX ADMIN — NICOTINE SCH MG: 21 PATCH, EXTENDED RELEASE TOPICAL at 09:47

## 2021-01-16 RX ADMIN — DEXAMETHASONE SODIUM PHOSPHATE SCH MG: 4 INJECTION, SOLUTION INTRAMUSCULAR; INTRAVENOUS at 14:26

## 2021-01-16 RX ADMIN — NITROGLYCERIN SCH GM: 20 OINTMENT TOPICAL at 12:00

## 2021-01-16 RX ADMIN — NITROGLYCERIN SCH GM: 20 OINTMENT TOPICAL at 18:06

## 2021-01-16 RX ADMIN — FAMOTIDINE SCH MG: 20 TABLET ORAL at 09:46

## 2021-01-16 RX ADMIN — Medication SCH MG: at 21:00

## 2021-01-16 RX ADMIN — ALBUTEROL SULFATE SCH GM: 90 AEROSOL, METERED RESPIRATORY (INHALATION) at 18:00

## 2021-01-16 RX ADMIN — ATORVASTATIN CALCIUM SCH MG: 20 TABLET, FILM COATED ORAL at 09:47

## 2021-01-16 RX ADMIN — NICOTINE SCH MG: 21 PATCH, EXTENDED RELEASE TOPICAL at 09:00

## 2021-01-16 RX ADMIN — ALBUTEROL SULFATE SCH GM: 90 AEROSOL, METERED RESPIRATORY (INHALATION) at 06:00

## 2021-01-16 RX ADMIN — APIXABAN SCH MG: 2.5 TABLET, FILM COATED ORAL at 21:00

## 2021-01-16 NOTE — NUR
ADMINISTERED SCHED MED AS PRESCRIBED PER MD ORDER. MEDICATION EDUCATION PERFORMED. PT 
VERBALIZED UNDERSTANDING. SAFETY MEASURES IN PLACE. WILL CONTINUE TO MONITOR

## 2021-01-16 NOTE — NUR
Patient sleeping during rounding. Fall and safety precautions maintained. VSS. Medications 
administered as ordered. No acute distress noted.

## 2021-01-16 NOTE — NUR
PT RESTING IN BED. ABLE TO MAKE NEEDS KNOWN. RESPIRATIONS EVEN AND UNLABORED WITH NO SOB OR 
RESPIRATORY DISTRESS. SKIN WARM AND DRY TO TOUCH. SAFETY MEASURES IN PLACE. WILL CONTINUE TO 
MONITOR

## 2021-01-16 NOTE — NUR
RECEIVED REPORT FROM NIGHTSHIFT NURSE. PT RESTING IN BED. ABLE TO MAKE NEEDS KNOWN. 
RESPIRATIONS EVEN AND UNLABORED WITH NO SOB OR RESPIRATORY DISTRESS. SKIN WARM AND DRY TO 
TOUCH. IV SITE IN MIDLINE IS CLEAN, DRY, AND INTACT. SAFETY MEASURES IN PLACE. WILL CONTINUE 
TO MONITOR

## 2021-01-16 NOTE — NUR
Patient received in bed alert and oriented to x 3. Discussed with plan of care. Medication 
regimen, fall and safety precautions and plan of care. Patient poorly receptive to plan of 
care attempting to self ambulate. Patient complaint of feeling agitated and frustrated with 
his medical condition. Yelling out loud "I want  to leave and go home". Fall and safety 
precautions maintained. RN and staff anticipated patient concerns. Will continue with plan 
of care. VSS.

## 2021-01-17 VITALS — DIASTOLIC BLOOD PRESSURE: 60 MMHG | SYSTOLIC BLOOD PRESSURE: 85 MMHG

## 2021-01-17 VITALS — SYSTOLIC BLOOD PRESSURE: 116 MMHG | DIASTOLIC BLOOD PRESSURE: 66 MMHG

## 2021-01-17 VITALS — DIASTOLIC BLOOD PRESSURE: 49 MMHG | SYSTOLIC BLOOD PRESSURE: 115 MMHG

## 2021-01-17 VITALS — SYSTOLIC BLOOD PRESSURE: 100 MMHG | DIASTOLIC BLOOD PRESSURE: 74 MMHG

## 2021-01-17 VITALS — DIASTOLIC BLOOD PRESSURE: 75 MMHG | SYSTOLIC BLOOD PRESSURE: 132 MMHG

## 2021-01-17 VITALS — SYSTOLIC BLOOD PRESSURE: 122 MMHG | DIASTOLIC BLOOD PRESSURE: 68 MMHG

## 2021-01-17 VITALS — SYSTOLIC BLOOD PRESSURE: 90 MMHG | DIASTOLIC BLOOD PRESSURE: 64 MMHG

## 2021-01-17 VITALS — SYSTOLIC BLOOD PRESSURE: 126 MMHG | DIASTOLIC BLOOD PRESSURE: 87 MMHG

## 2021-01-17 VITALS — DIASTOLIC BLOOD PRESSURE: 84 MMHG | SYSTOLIC BLOOD PRESSURE: 133 MMHG

## 2021-01-17 VITALS — SYSTOLIC BLOOD PRESSURE: 118 MMHG | DIASTOLIC BLOOD PRESSURE: 68 MMHG

## 2021-01-17 VITALS — SYSTOLIC BLOOD PRESSURE: 125 MMHG | DIASTOLIC BLOOD PRESSURE: 91 MMHG

## 2021-01-17 VITALS — DIASTOLIC BLOOD PRESSURE: 68 MMHG | SYSTOLIC BLOOD PRESSURE: 110 MMHG

## 2021-01-17 VITALS — DIASTOLIC BLOOD PRESSURE: 86 MMHG | SYSTOLIC BLOOD PRESSURE: 123 MMHG

## 2021-01-17 VITALS — SYSTOLIC BLOOD PRESSURE: 113 MMHG | DIASTOLIC BLOOD PRESSURE: 66 MMHG

## 2021-01-17 VITALS — SYSTOLIC BLOOD PRESSURE: 123 MMHG | DIASTOLIC BLOOD PRESSURE: 77 MMHG

## 2021-01-17 VITALS — DIASTOLIC BLOOD PRESSURE: 66 MMHG | SYSTOLIC BLOOD PRESSURE: 116 MMHG

## 2021-01-17 VITALS — SYSTOLIC BLOOD PRESSURE: 111 MMHG | DIASTOLIC BLOOD PRESSURE: 88 MMHG

## 2021-01-17 LAB
ALBUMIN FLD-MCNC: 2.5 G/DL (ref 3.4–5)
ALBUMIN FLD-MCNC: 2.6 G/DL (ref 3.4–5)
ANION GAP SERPL CALCULATED.3IONS-SCNC: 13.4 MMOL/L (ref 8–16)
ANION GAP SERPL CALCULATED.3IONS-SCNC: 19.9 MMOL/L (ref 8–16)
AST SERPL-CCNC: 379 U/L (ref 15–37)
AST SERPL-CCNC: 383 U/L (ref 15–37)
BASOPHILS # BLD AUTO: 0 K/UL (ref 0–0.22)
BASOPHILS NFR BLD AUTO: 0.1 % (ref 0–2)
BILIRUB DIRECT SERPL-MCNC: 2 MG/DL (ref 0–0.3)
BILIRUB SERPL-MCNC: 2.5 MG/DL (ref 0–1)
BILIRUB SERPL-MCNC: 2.6 MG/DL (ref 0–1)
BUN SERPL-MCNC: 65 MG/DL (ref 7–18)
BUN SERPL-MCNC: 78 MG/DL (ref 7–18)
CHLORIDE SERPL-SCNC: 88 MMOL/L (ref 98–107)
CHLORIDE SERPL-SCNC: 91 MMOL/L (ref 98–107)
CO2 SERPL-SCNC: 22.2 MMOL/L (ref 21–32)
CO2 SERPL-SCNC: 27.7 MMOL/L (ref 21–32)
CREAT SERPL-MCNC: 2.4 MG/DL (ref 0.6–1.3)
CREAT SERPL-MCNC: 2.9 MG/DL (ref 0.6–1.3)
EOSINOPHIL # BLD AUTO: 0 K/UL (ref 0–0.4)
EOSINOPHIL NFR BLD AUTO: 0.1 % (ref 0–4)
ERYTHROCYTE [DISTWIDTH] IN BLOOD BY AUTOMATED COUNT: 15.8 % (ref 11.6–13.7)
GFR SERPL CREATININE-BSD FRML MDRD: 29 ML/MIN (ref 90–?)
GFR SERPL CREATININE-BSD FRML MDRD: 36 ML/MIN (ref 90–?)
GLUCOSE SERPL-MCNC: 240 MG/DL (ref 74–106)
GLUCOSE SERPL-MCNC: 59 MG/DL (ref 74–106)
HCT VFR BLD AUTO: 38.4 % (ref 36–52)
HGB BLD-MCNC: 12.2 G/DL (ref 12–18)
LYMPHOCYTES # BLD AUTO: 1.3 K/UL (ref 2–11.5)
LYMPHOCYTES NFR BLD AUTO: 7.8 % (ref 20.5–51.1)
MCH RBC QN AUTO: 29 PG (ref 27–31)
MCHC RBC AUTO-ENTMCNC: 32 G/DL (ref 33–37)
MCV RBC AUTO: 89.8 FL (ref 80–94)
MONOCYTES # BLD AUTO: 1.7 K/UL (ref 0.8–1)
MONOCYTES NFR BLD AUTO: 10 % (ref 1.7–9.3)
NEUTROPHILS # BLD AUTO: 14 K/UL (ref 1.8–7.7)
NEUTROPHILS NFR BLD AUTO: 82 % (ref 42.2–75.2)
PLATELET # BLD AUTO: 329 K/UL (ref 140–450)
POTASSIUM SERPL-SCNC: 6.1 MMOL/L (ref 3.5–5.1)
POTASSIUM SERPL-SCNC: 7.1 MMOL/L (ref 3.5–5.1)
RBC # BLD AUTO: 4.28 MIL/UL (ref 4.2–6.1)
SODIUM SERPL-SCNC: 124 MMOL/L (ref 136–145)
SODIUM SERPL-SCNC: 125 MMOL/L (ref 136–145)
WBC # BLD AUTO: 17.1 K/UL (ref 4.8–10.8)

## 2021-01-17 PROCEDURE — 5A1945Z RESPIRATORY VENTILATION, 24-96 CONSECUTIVE HOURS: ICD-10-PCS | Performed by: INTERNAL MEDICINE

## 2021-01-17 PROCEDURE — 0BH17EZ INSERTION OF ENDOTRACHEAL AIRWAY INTO TRACHEA, VIA NATURAL OR ARTIFICIAL OPENING: ICD-10-PCS | Performed by: INTERNAL MEDICINE

## 2021-01-17 PROCEDURE — 5A1935Z RESPIRATORY VENTILATION, LESS THAN 24 CONSECUTIVE HOURS: ICD-10-PCS | Performed by: INTERNAL MEDICINE

## 2021-01-17 RX ADMIN — NOREPINEPHRINE BITARTRATE PRN MLS/HR: 1 INJECTION INTRAVENOUS at 15:44

## 2021-01-17 RX ADMIN — NICOTINE SCH MG: 21 PATCH, EXTENDED RELEASE TOPICAL at 09:26

## 2021-01-17 RX ADMIN — NOREPINEPHRINE BITARTRATE PRN MLS/HR: 1 INJECTION INTRAVENOUS at 19:30

## 2021-01-17 RX ADMIN — Medication SCH MG: at 09:00

## 2021-01-17 RX ADMIN — NOREPINEPHRINE BITARTRATE PRN MLS/HR: 1 INJECTION INTRAVENOUS at 23:00

## 2021-01-17 RX ADMIN — NITROGLYCERIN SCH GM: 20 OINTMENT TOPICAL at 00:12

## 2021-01-17 RX ADMIN — SODIUM CHLORIDE SCH MLS/HR: 0.45 INJECTION, SOLUTION INTRAVENOUS at 22:15

## 2021-01-17 RX ADMIN — SODIUM ZIRCONIUM CYCLOSILICATE SCH GM: 10 POWDER, FOR SUSPENSION ORAL at 11:30

## 2021-01-17 RX ADMIN — DEXTROSE SCH MLS/HR: 50 INJECTION, SOLUTION INTRAVENOUS at 20:22

## 2021-01-17 RX ADMIN — ATORVASTATIN CALCIUM SCH MG: 20 TABLET, FILM COATED ORAL at 09:22

## 2021-01-17 RX ADMIN — PROPOFOL PRN MLS/HR: 10 INJECTION, EMULSION INTRAVENOUS at 10:06

## 2021-01-17 RX ADMIN — APIXABAN SCH MG: 2.5 TABLET, FILM COATED ORAL at 20:22

## 2021-01-17 RX ADMIN — PROPOFOL PRN MLS/HR: 10 INJECTION, EMULSION INTRAVENOUS at 23:15

## 2021-01-17 RX ADMIN — DEXAMETHASONE SODIUM PHOSPHATE SCH MG: 4 INJECTION, SOLUTION INTRAMUSCULAR; INTRAVENOUS at 14:35

## 2021-01-17 RX ADMIN — FAMOTIDINE SCH MG: 20 TABLET ORAL at 09:26

## 2021-01-17 RX ADMIN — SODIUM ZIRCONIUM CYCLOSILICATE SCH GM: 10 POWDER, FOR SUSPENSION ORAL at 18:01

## 2021-01-17 RX ADMIN — DEXTROSE SCH MLS/HR: 50 INJECTION, SOLUTION INTRAVENOUS at 16:35

## 2021-01-17 RX ADMIN — PROPOFOL PRN MLS/HR: 10 INJECTION, EMULSION INTRAVENOUS at 18:58

## 2021-01-17 RX ADMIN — NITROGLYCERIN SCH GM: 20 OINTMENT TOPICAL at 18:00

## 2021-01-17 RX ADMIN — APIXABAN SCH MG: 2.5 TABLET, FILM COATED ORAL at 09:20

## 2021-01-17 RX ADMIN — DEXTROSE SCH MLS/HR: 50 INJECTION, SOLUTION INTRAVENOUS at 15:36

## 2021-01-17 NOTE — NUR
Patient transported to ICU  with Code Team. Patient transported via stretcher to ICU 
accompanied with attending ER Staff. Charge RN present during time of transport.

## 2021-01-17 NOTE — NUR
Critical results of K-6.1, BUN 65, trop-0.388 relayed to DR Margie duenas, with orders,carried 
out.

At 1700 Dr Camacho informed of needed PICC line for all the drips and she agreed.

Brother  Kael Roman consented for the procedure.

PICC line coming at 1930.

Still on Levophed drip,Lasix drip,HCO3 drip, continue to monitor.

## 2021-01-17 NOTE — NUR
SEIZURES WITH RAPID EYE MOVEMENT, FACIAL TICK/ R HAND NOTED -150S. ATIVAN PRN GIVEN, 
SCHEDULED KEPPRA GIVEN, AIRWAY PATENT, BITE BLOCK IN PLACE. WILL CONTINUE TO OBSERVE.



2000: 

PT CALM, FLACC 0, VITAL SIGNS WNL, WILL CONTINUE TO OBSERVE.

## 2021-01-17 NOTE — NUR
1/17/21 

RD INITIAL ASSESSMENT COMPLETED



PLEASE REFER TO NUTRITION ASSESSMENT UNDER CARE ACTIVITY FOR ESTIMATED NUTRITIONAL NEEDS. 



1. RECOMMEND GLUCERNA 1.2 @ GOAL RATE OF 65 ML/HR X 24 HR. BEGIN AT 10 ML/HR ADVANCE 10 ML 
Q4H AS TOLERATED

- THIS WILL PROVIDE 1872 KCAL AND 94 G OF PROTEIN MEETING 100% OF ESTIMATED KCAL NEEDS AND 
83% OF ESTIMATED PROTEIN NEEDS

2. RECOMMEND FREE WATER FLUSH 100 ML Q6H 

3. RD TO FOLLOW UP IN 2-3 DAYS, HIGH RISK



PHYLLIS RUBIO RD

## 2021-01-17 NOTE — NUR
CALLED  AND INFORMED HIM ABOUT PT'S STATUS ALSO TRIED TO CALLED HIS BROTHER AZEB 
[phone] IT WENT TO FAX.SO CALLED [phone] AND INFORMED HER ABOUT PT'S 
CONDITION.

## 2021-01-17 NOTE — NUR
RN rounding at 0250, patient found in bed unresponsive with no pulse or respirations or 
blood pressure. RN initiated CPR and called Code Blue. RN Charge nurse notified. Will 
continue with plan of care.

## 2021-01-18 VITALS — DIASTOLIC BLOOD PRESSURE: 85 MMHG | SYSTOLIC BLOOD PRESSURE: 146 MMHG

## 2021-01-18 VITALS — DIASTOLIC BLOOD PRESSURE: 69 MMHG | SYSTOLIC BLOOD PRESSURE: 125 MMHG

## 2021-01-18 VITALS — DIASTOLIC BLOOD PRESSURE: 84 MMHG | SYSTOLIC BLOOD PRESSURE: 129 MMHG

## 2021-01-18 VITALS — SYSTOLIC BLOOD PRESSURE: 132 MMHG | DIASTOLIC BLOOD PRESSURE: 78 MMHG

## 2021-01-18 VITALS — SYSTOLIC BLOOD PRESSURE: 145 MMHG | DIASTOLIC BLOOD PRESSURE: 85 MMHG

## 2021-01-18 VITALS — SYSTOLIC BLOOD PRESSURE: 132 MMHG | DIASTOLIC BLOOD PRESSURE: 68 MMHG

## 2021-01-18 VITALS — SYSTOLIC BLOOD PRESSURE: 137 MMHG | DIASTOLIC BLOOD PRESSURE: 84 MMHG

## 2021-01-18 VITALS — DIASTOLIC BLOOD PRESSURE: 79 MMHG | SYSTOLIC BLOOD PRESSURE: 134 MMHG

## 2021-01-18 VITALS — DIASTOLIC BLOOD PRESSURE: 80 MMHG | SYSTOLIC BLOOD PRESSURE: 121 MMHG

## 2021-01-18 VITALS — DIASTOLIC BLOOD PRESSURE: 80 MMHG | SYSTOLIC BLOOD PRESSURE: 140 MMHG

## 2021-01-18 VITALS — DIASTOLIC BLOOD PRESSURE: 85 MMHG | SYSTOLIC BLOOD PRESSURE: 139 MMHG

## 2021-01-18 VITALS — DIASTOLIC BLOOD PRESSURE: 79 MMHG | SYSTOLIC BLOOD PRESSURE: 125 MMHG

## 2021-01-18 VITALS — SYSTOLIC BLOOD PRESSURE: 150 MMHG | DIASTOLIC BLOOD PRESSURE: 90 MMHG

## 2021-01-18 VITALS — SYSTOLIC BLOOD PRESSURE: 120 MMHG | DIASTOLIC BLOOD PRESSURE: 77 MMHG

## 2021-01-18 VITALS — DIASTOLIC BLOOD PRESSURE: 70 MMHG | SYSTOLIC BLOOD PRESSURE: 110 MMHG

## 2021-01-18 VITALS — DIASTOLIC BLOOD PRESSURE: 68 MMHG | SYSTOLIC BLOOD PRESSURE: 99 MMHG

## 2021-01-18 VITALS — SYSTOLIC BLOOD PRESSURE: 99 MMHG | DIASTOLIC BLOOD PRESSURE: 68 MMHG

## 2021-01-18 VITALS — SYSTOLIC BLOOD PRESSURE: 134 MMHG | DIASTOLIC BLOOD PRESSURE: 67 MMHG

## 2021-01-18 VITALS — DIASTOLIC BLOOD PRESSURE: 77 MMHG | SYSTOLIC BLOOD PRESSURE: 130 MMHG

## 2021-01-18 VITALS — DIASTOLIC BLOOD PRESSURE: 76 MMHG | SYSTOLIC BLOOD PRESSURE: 130 MMHG

## 2021-01-18 VITALS — SYSTOLIC BLOOD PRESSURE: 141 MMHG | DIASTOLIC BLOOD PRESSURE: 82 MMHG

## 2021-01-18 VITALS — DIASTOLIC BLOOD PRESSURE: 71 MMHG | SYSTOLIC BLOOD PRESSURE: 140 MMHG

## 2021-01-18 VITALS — SYSTOLIC BLOOD PRESSURE: 92 MMHG | DIASTOLIC BLOOD PRESSURE: 63 MMHG

## 2021-01-18 VITALS — SYSTOLIC BLOOD PRESSURE: 141 MMHG | DIASTOLIC BLOOD PRESSURE: 84 MMHG

## 2021-01-18 VITALS — DIASTOLIC BLOOD PRESSURE: 81 MMHG | SYSTOLIC BLOOD PRESSURE: 129 MMHG

## 2021-01-18 LAB
ALBUMIN FLD-MCNC: 2.3 G/DL (ref 3.4–5)
ANION GAP SERPL CALCULATED.3IONS-SCNC: 12.1 MMOL/L (ref 8–16)
ANION GAP SERPL CALCULATED.3IONS-SCNC: 13.6 MMOL/L (ref 8–16)
AST SERPL-CCNC: 302 U/L (ref 15–37)
BASOPHILS # BLD AUTO: 0 K/UL (ref 0–0.22)
BASOPHILS NFR BLD AUTO: 0.2 % (ref 0–2)
BILIRUB SERPL-MCNC: 2.6 MG/DL (ref 0–1)
BUN SERPL-MCNC: 81 MG/DL (ref 7–18)
BUN SERPL-MCNC: 86 MG/DL (ref 7–18)
CHLORIDE SERPL-SCNC: 87 MMOL/L (ref 98–107)
CHLORIDE SERPL-SCNC: 89 MMOL/L (ref 98–107)
CO2 SERPL-SCNC: 28.4 MMOL/L (ref 21–32)
CO2 SERPL-SCNC: 32.1 MMOL/L (ref 21–32)
CREAT SERPL-MCNC: 2.7 MG/DL (ref 0.6–1.3)
CREAT SERPL-MCNC: 2.9 MG/DL (ref 0.6–1.3)
EOSINOPHIL # BLD AUTO: 0 K/UL (ref 0–0.4)
EOSINOPHIL NFR BLD AUTO: 0 % (ref 0–4)
ERYTHROCYTE [DISTWIDTH] IN BLOOD BY AUTOMATED COUNT: 15.5 % (ref 11.6–13.7)
GFR SERPL CREATININE-BSD FRML MDRD: 29 ML/MIN (ref 90–?)
GFR SERPL CREATININE-BSD FRML MDRD: 31 ML/MIN (ref 90–?)
GLUCOSE SERPL-MCNC: 259 MG/DL (ref 74–106)
GLUCOSE SERPL-MCNC: 286 MG/DL (ref 74–106)
HCT VFR BLD AUTO: 35.2 % (ref 36–52)
HGB BLD-MCNC: 11.7 G/DL (ref 12–18)
LYMPHOCYTES # BLD AUTO: 1 K/UL (ref 2–11.5)
LYMPHOCYTES NFR BLD AUTO: 6.3 % (ref 20.5–51.1)
MCH RBC QN AUTO: 29 PG (ref 27–31)
MCHC RBC AUTO-ENTMCNC: 33 G/DL (ref 33–37)
MCV RBC AUTO: 86.4 FL (ref 80–94)
MONOCYTES # BLD AUTO: 1 K/UL (ref 0.8–1)
MONOCYTES NFR BLD AUTO: 6.3 % (ref 1.7–9.3)
NEUTROPHILS # BLD AUTO: 13.7 K/UL (ref 1.8–7.7)
NEUTROPHILS NFR BLD AUTO: 87.2 % (ref 42.2–75.2)
PLATELET # BLD AUTO: 342 K/UL (ref 140–450)
POTASSIUM SERPL-SCNC: 3.7 MMOL/L (ref 3.5–5.1)
POTASSIUM SERPL-SCNC: 4.5 MMOL/L (ref 3.5–5.1)
RBC # BLD AUTO: 4.08 MIL/UL (ref 4.2–6.1)
SODIUM SERPL-SCNC: 125 MMOL/L (ref 136–145)
SODIUM SERPL-SCNC: 129 MMOL/L (ref 136–145)
WBC # BLD AUTO: 15.7 K/UL (ref 4.8–10.8)

## 2021-01-18 PROCEDURE — 5A1935Z RESPIRATORY VENTILATION, LESS THAN 24 CONSECUTIVE HOURS: ICD-10-PCS | Performed by: INTERNAL MEDICINE

## 2021-01-18 RX ADMIN — INSULIN LISPRO PRN UNITS: 100 INJECTION, SOLUTION INTRAVENOUS; SUBCUTANEOUS at 12:16

## 2021-01-18 RX ADMIN — APIXABAN SCH MG: 2.5 TABLET, FILM COATED ORAL at 08:25

## 2021-01-18 RX ADMIN — ALBUTEROL SULFATE SCH GM: 90 AEROSOL, METERED RESPIRATORY (INHALATION) at 18:00

## 2021-01-18 RX ADMIN — DEXTROSE SCH MLS/HR: 50 INJECTION, SOLUTION INTRAVENOUS at 21:00

## 2021-01-18 RX ADMIN — SODIUM CHLORIDE SCH MLS/HR: 0.45 INJECTION, SOLUTION INTRAVENOUS at 21:30

## 2021-01-18 RX ADMIN — NITROGLYCERIN SCH GM: 20 OINTMENT TOPICAL at 00:00

## 2021-01-18 RX ADMIN — PANTOPRAZOLE SODIUM SCH MG: 40 INJECTION, POWDER, FOR SOLUTION INTRAVENOUS at 08:26

## 2021-01-18 RX ADMIN — PROPOFOL PRN MLS/HR: 10 INJECTION, EMULSION INTRAVENOUS at 17:06

## 2021-01-18 RX ADMIN — NITROGLYCERIN SCH GM: 20 OINTMENT TOPICAL at 11:09

## 2021-01-18 RX ADMIN — PROPOFOL PRN MLS/HR: 10 INJECTION, EMULSION INTRAVENOUS at 05:01

## 2021-01-18 RX ADMIN — NOREPINEPHRINE BITARTRATE PRN MLS/HR: 1 INJECTION INTRAVENOUS at 07:23

## 2021-01-18 RX ADMIN — PROPOFOL PRN MLS/HR: 10 INJECTION, EMULSION INTRAVENOUS at 09:37

## 2021-01-18 RX ADMIN — NOREPINEPHRINE BITARTRATE PRN MLS/HR: 1 INJECTION INTRAVENOUS at 18:13

## 2021-01-18 RX ADMIN — NICOTINE SCH MG: 21 PATCH, EXTENDED RELEASE TOPICAL at 08:28

## 2021-01-18 RX ADMIN — APIXABAN SCH MG: 2.5 TABLET, FILM COATED ORAL at 21:00

## 2021-01-18 RX ADMIN — DEXAMETHASONE SODIUM PHOSPHATE SCH MG: 4 INJECTION, SOLUTION INTRAMUSCULAR; INTRAVENOUS at 13:30

## 2021-01-18 RX ADMIN — PROPOFOL PRN MLS/HR: 10 INJECTION, EMULSION INTRAVENOUS at 20:25

## 2021-01-18 RX ADMIN — DEXTROSE SCH MLS/HR: 50 INJECTION, SOLUTION INTRAVENOUS at 08:27

## 2021-01-18 RX ADMIN — NITROGLYCERIN SCH GM: 20 OINTMENT TOPICAL at 17:06

## 2021-01-18 RX ADMIN — INSULIN LISPRO PRN UNITS: 100 INJECTION, SOLUTION INTRAVENOUS; SUBCUTANEOUS at 17:35

## 2021-01-18 RX ADMIN — ALBUTEROL SULFATE SCH GM: 90 AEROSOL, METERED RESPIRATORY (INHALATION) at 12:00

## 2021-01-18 RX ADMIN — NITROGLYCERIN SCH GM: 20 OINTMENT TOPICAL at 06:00

## 2021-01-18 RX ADMIN — DEXTROSE SCH MLS/HR: 50 INJECTION, SOLUTION INTRAVENOUS at 16:06

## 2021-01-18 RX ADMIN — ATORVASTATIN CALCIUM SCH MG: 20 TABLET, FILM COATED ORAL at 08:23

## 2021-01-18 NOTE — NUR
REC'D PT ON CARESCAPE VENT SETTINGS AC 24  PEEP 5 FIO2 50% ALARMS ON AND AUDIBLE AND 
VENT IS PLUGGED INTO RED OUTLET AND BVM AT HOB, B\S ARE DIMINISHED SXN PT SMALL AMT OF CLEAR 
SECRETIONS, PT IS ORALLY INTUBATED WITH 

7.5 ETT SECURED AT 23CM PT IS RESTING

## 2021-01-18 NOTE — NUR
Received  pt  vented via ETT with FIO2 50%,, saturating well, between 98-99%, with OGT 
intact, patent , with  cagle catheter patent with good amount of urine.

## 2021-01-18 NOTE — NUR
Dr Rivera here, seen pt, noted all parameters, noted to him of too much urine output and he 
said to decrease the Lasix  drip to 5mgs/hr that is 5mls /hr.Dr Rivera explained in full 
detaisl  pt critical condition  to family memeber and as per Rayna  ,Kael Ellis (brother 
of the patient ) wanted  full DNR for the pt.

## 2021-01-18 NOTE — NUR
RECEIVED PT FROM DAY SHIFT RN. PT SEDATED ON PROPOFOL DRIP, RASS -3, UNABLE TO FOLLOW 
COMMANDS AT THIS TIME, PUPILS +4 SLUGGISH BILATERALLY. SR ON MONITOR, CURRENTLY LEVOPHED 
DRIP, PALPABLE PULSES, +2 GENERALIZED EDEMA. ETT TO VENT ON 50% FIO2, PEEP OF 5. OGT IN 
PLACE, CLAMPED AT THIS TIME, 10 ML OF RESIDUAL NOTED, HYPOACTIVE BOWEL SOUNDS. RAMIREZ 
CATHETER IN PLACE, YELLOW URINE NOTED. DISCOLORATION TO BLE. 18G IN DIPTI, PICC VINCE, LASIX 
DRIP RUNNING AT 5 ML/HR. SAFETY PRECAUTIONS IN PLACE, BED LOCKED AT LOWEST POSITION, HOB 
GREATER THAN 30 DEGREES. WILL CONTINUE TO MONITOR.

## 2021-01-19 VITALS — SYSTOLIC BLOOD PRESSURE: 123 MMHG | DIASTOLIC BLOOD PRESSURE: 71 MMHG

## 2021-01-19 VITALS — DIASTOLIC BLOOD PRESSURE: 77 MMHG | SYSTOLIC BLOOD PRESSURE: 147 MMHG

## 2021-01-19 VITALS — DIASTOLIC BLOOD PRESSURE: 69 MMHG | SYSTOLIC BLOOD PRESSURE: 157 MMHG

## 2021-01-19 VITALS — DIASTOLIC BLOOD PRESSURE: 74 MMHG | SYSTOLIC BLOOD PRESSURE: 136 MMHG

## 2021-01-19 VITALS — DIASTOLIC BLOOD PRESSURE: 52 MMHG | SYSTOLIC BLOOD PRESSURE: 137 MMHG

## 2021-01-19 VITALS — DIASTOLIC BLOOD PRESSURE: 82 MMHG | SYSTOLIC BLOOD PRESSURE: 166 MMHG

## 2021-01-19 VITALS — SYSTOLIC BLOOD PRESSURE: 120 MMHG | DIASTOLIC BLOOD PRESSURE: 61 MMHG

## 2021-01-19 VITALS — SYSTOLIC BLOOD PRESSURE: 115 MMHG | DIASTOLIC BLOOD PRESSURE: 65 MMHG

## 2021-01-19 VITALS — SYSTOLIC BLOOD PRESSURE: 154 MMHG | DIASTOLIC BLOOD PRESSURE: 85 MMHG

## 2021-01-19 VITALS — DIASTOLIC BLOOD PRESSURE: 95 MMHG | SYSTOLIC BLOOD PRESSURE: 135 MMHG

## 2021-01-19 VITALS — DIASTOLIC BLOOD PRESSURE: 88 MMHG | SYSTOLIC BLOOD PRESSURE: 175 MMHG

## 2021-01-19 VITALS — SYSTOLIC BLOOD PRESSURE: 122 MMHG | DIASTOLIC BLOOD PRESSURE: 75 MMHG

## 2021-01-19 VITALS — DIASTOLIC BLOOD PRESSURE: 85 MMHG | SYSTOLIC BLOOD PRESSURE: 124 MMHG

## 2021-01-19 VITALS — SYSTOLIC BLOOD PRESSURE: 132 MMHG | DIASTOLIC BLOOD PRESSURE: 75 MMHG

## 2021-01-19 VITALS — SYSTOLIC BLOOD PRESSURE: 131 MMHG | DIASTOLIC BLOOD PRESSURE: 73 MMHG

## 2021-01-19 VITALS — SYSTOLIC BLOOD PRESSURE: 120 MMHG | DIASTOLIC BLOOD PRESSURE: 66 MMHG

## 2021-01-19 VITALS — SYSTOLIC BLOOD PRESSURE: 158 MMHG | DIASTOLIC BLOOD PRESSURE: 73 MMHG

## 2021-01-19 VITALS — SYSTOLIC BLOOD PRESSURE: 131 MMHG | DIASTOLIC BLOOD PRESSURE: 76 MMHG

## 2021-01-19 VITALS — DIASTOLIC BLOOD PRESSURE: 66 MMHG | SYSTOLIC BLOOD PRESSURE: 124 MMHG

## 2021-01-19 VITALS — SYSTOLIC BLOOD PRESSURE: 13 MMHG | DIASTOLIC BLOOD PRESSURE: 67 MMHG

## 2021-01-19 VITALS — SYSTOLIC BLOOD PRESSURE: 180 MMHG | DIASTOLIC BLOOD PRESSURE: 88 MMHG

## 2021-01-19 VITALS — DIASTOLIC BLOOD PRESSURE: 76 MMHG | SYSTOLIC BLOOD PRESSURE: 159 MMHG

## 2021-01-19 VITALS — DIASTOLIC BLOOD PRESSURE: 77 MMHG | SYSTOLIC BLOOD PRESSURE: 126 MMHG

## 2021-01-19 VITALS — DIASTOLIC BLOOD PRESSURE: 74 MMHG | SYSTOLIC BLOOD PRESSURE: 134 MMHG

## 2021-01-19 VITALS — SYSTOLIC BLOOD PRESSURE: 126 MMHG | DIASTOLIC BLOOD PRESSURE: 69 MMHG

## 2021-01-19 VITALS — DIASTOLIC BLOOD PRESSURE: 75 MMHG | SYSTOLIC BLOOD PRESSURE: 122 MMHG

## 2021-01-19 VITALS — DIASTOLIC BLOOD PRESSURE: 68 MMHG | SYSTOLIC BLOOD PRESSURE: 127 MMHG

## 2021-01-19 LAB
ALBUMIN FLD-MCNC: 2.4 G/DL (ref 3.4–5)
ANION GAP SERPL CALCULATED.3IONS-SCNC: 11.2 MMOL/L (ref 8–16)
ANION GAP SERPL CALCULATED.3IONS-SCNC: 8.5 MMOL/L (ref 8–16)
BASOPHILS # BLD AUTO: 0.1 K/UL (ref 0–0.22)
BASOPHILS NFR BLD AUTO: 0.3 % (ref 0–2)
BILIRUB SERPL-MCNC: 2.8 MG/DL (ref 0–1)
BUN SERPL-MCNC: 86 MG/DL (ref 7–18)
BUN SERPL-MCNC: 92 MG/DL (ref 7–18)
CHLORIDE SERPL-SCNC: 87 MMOL/L (ref 98–107)
CHLORIDE SERPL-SCNC: 90 MMOL/L (ref 98–107)
CO2 SERPL-SCNC: 37.4 MMOL/L (ref 21–32)
CO2 SERPL-SCNC: 37.6 MMOL/L (ref 21–32)
CREAT SERPL-MCNC: 2.1 MG/DL (ref 0.6–1.3)
CREAT SERPL-MCNC: 2.2 MG/DL (ref 0.6–1.3)
EOSINOPHIL # BLD AUTO: 0 K/UL (ref 0–0.4)
EOSINOPHIL NFR BLD AUTO: 0 % (ref 0–4)
ERYTHROCYTE [DISTWIDTH] IN BLOOD BY AUTOMATED COUNT: 15.2 % (ref 11.6–13.7)
GFR SERPL CREATININE-BSD FRML MDRD: 40 ML/MIN (ref 90–?)
GFR SERPL CREATININE-BSD FRML MDRD: 42 ML/MIN (ref 90–?)
GLUCOSE SERPL-MCNC: 251 MG/DL (ref 74–106)
GLUCOSE SERPL-MCNC: 322 MG/DL (ref 74–106)
HCT VFR BLD AUTO: 39 % (ref 36–52)
HGB BLD-MCNC: 13 G/DL (ref 12–18)
LYMPHOCYTES # BLD AUTO: 0.9 K/UL (ref 2–11.5)
LYMPHOCYTES NFR BLD AUTO: 4.3 % (ref 20.5–51.1)
MCH RBC QN AUTO: 29 PG (ref 27–31)
MCHC RBC AUTO-ENTMCNC: 33 G/DL (ref 33–37)
MCV RBC AUTO: 87.4 FL (ref 80–94)
MONOCYTES # BLD AUTO: 1.4 K/UL (ref 0.8–1)
MONOCYTES NFR BLD AUTO: 7 % (ref 1.7–9.3)
NEUTROPHILS # BLD AUTO: 17.5 K/UL (ref 1.8–7.7)
NEUTROPHILS NFR BLD AUTO: 88.4 % (ref 42.2–75.2)
PLATELET # BLD AUTO: 378 K/UL (ref 140–450)
POTASSIUM SERPL-SCNC: 2.6 MMOL/L (ref 3.5–5.1)
POTASSIUM SERPL-SCNC: 3.1 MMOL/L (ref 3.5–5.1)
RBC # BLD AUTO: 4.46 MIL/UL (ref 4.2–6.1)
SODIUM SERPL-SCNC: 133 MMOL/L (ref 136–145)
SODIUM SERPL-SCNC: 133 MMOL/L (ref 136–145)
WBC # BLD AUTO: 19.8 K/UL (ref 4.8–10.8)

## 2021-01-19 PROCEDURE — 5A1955Z RESPIRATORY VENTILATION, GREATER THAN 96 CONSECUTIVE HOURS: ICD-10-PCS | Performed by: INTERNAL MEDICINE

## 2021-01-19 PROCEDURE — 5A1935Z RESPIRATORY VENTILATION, LESS THAN 24 CONSECUTIVE HOURS: ICD-10-PCS | Performed by: INTERNAL MEDICINE

## 2021-01-19 RX ADMIN — ATORVASTATIN CALCIUM SCH MG: 20 TABLET, FILM COATED ORAL at 09:04

## 2021-01-19 RX ADMIN — POTASSIUM CHLORIDE PRN MLS/HR: 200 INJECTION, SOLUTION INTRAVENOUS at 10:53

## 2021-01-19 RX ADMIN — DEXTROSE SCH MLS/HR: 50 INJECTION, SOLUTION INTRAVENOUS at 09:28

## 2021-01-19 RX ADMIN — DEXAMETHASONE SODIUM PHOSPHATE SCH MG: 4 INJECTION, SOLUTION INTRAMUSCULAR; INTRAVENOUS at 15:27

## 2021-01-19 RX ADMIN — POTASSIUM CHLORIDE SCH MLS/HR: 200 INJECTION, SOLUTION INTRAVENOUS at 13:01

## 2021-01-19 RX ADMIN — PROPOFOL PRN MLS/HR: 10 INJECTION, EMULSION INTRAVENOUS at 22:58

## 2021-01-19 RX ADMIN — ALBUTEROL SULFATE SCH GM: 90 AEROSOL, METERED RESPIRATORY (INHALATION) at 06:00

## 2021-01-19 RX ADMIN — INSULIN LISPRO PRN UNITS: 100 INJECTION, SOLUTION INTRAVENOUS; SUBCUTANEOUS at 18:38

## 2021-01-19 RX ADMIN — PROPOFOL PRN MLS/HR: 10 INJECTION, EMULSION INTRAVENOUS at 16:10

## 2021-01-19 RX ADMIN — NITROGLYCERIN SCH GM: 20 OINTMENT TOPICAL at 12:32

## 2021-01-19 RX ADMIN — POTASSIUM CHLORIDE SCH MLS/HR: 200 INJECTION, SOLUTION INTRAVENOUS at 16:00

## 2021-01-19 RX ADMIN — ALBUTEROL SULFATE SCH GM: 90 AEROSOL, METERED RESPIRATORY (INHALATION) at 00:00

## 2021-01-19 RX ADMIN — NITROGLYCERIN SCH GM: 20 OINTMENT TOPICAL at 00:00

## 2021-01-19 RX ADMIN — ALBUTEROL SULFATE SCH GM: 90 AEROSOL, METERED RESPIRATORY (INHALATION) at 12:00

## 2021-01-19 RX ADMIN — APIXABAN SCH MG: 2.5 TABLET, FILM COATED ORAL at 21:45

## 2021-01-19 RX ADMIN — DEXTROSE SCH MLS/HR: 50 INJECTION, SOLUTION INTRAVENOUS at 07:26

## 2021-01-19 RX ADMIN — PROPOFOL PRN MLS/HR: 10 INJECTION, EMULSION INTRAVENOUS at 04:00

## 2021-01-19 RX ADMIN — NITROGLYCERIN SCH GM: 20 OINTMENT TOPICAL at 06:00

## 2021-01-19 RX ADMIN — ALBUTEROL SULFATE SCH GM: 90 AEROSOL, METERED RESPIRATORY (INHALATION) at 18:00

## 2021-01-19 RX ADMIN — PANTOPRAZOLE SODIUM SCH MG: 40 INJECTION, POWDER, FOR SOLUTION INTRAVENOUS at 09:04

## 2021-01-19 RX ADMIN — SODIUM CHLORIDE SCH MLS/HR: 0.45 INJECTION, SOLUTION INTRAVENOUS at 21:30

## 2021-01-19 RX ADMIN — APIXABAN SCH MG: 2.5 TABLET, FILM COATED ORAL at 09:13

## 2021-01-19 RX ADMIN — DEXTROSE SCH MLS/HR: 50 INJECTION, SOLUTION INTRAVENOUS at 21:10

## 2021-01-19 RX ADMIN — NICOTINE SCH MG: 21 PATCH, EXTENDED RELEASE TOPICAL at 09:04

## 2021-01-19 RX ADMIN — NITROGLYCERIN SCH GM: 20 OINTMENT TOPICAL at 17:23

## 2021-01-20 VITALS — SYSTOLIC BLOOD PRESSURE: 129 MMHG | DIASTOLIC BLOOD PRESSURE: 77 MMHG

## 2021-01-20 VITALS — DIASTOLIC BLOOD PRESSURE: 64 MMHG | SYSTOLIC BLOOD PRESSURE: 135 MMHG

## 2021-01-20 VITALS — SYSTOLIC BLOOD PRESSURE: 118 MMHG | DIASTOLIC BLOOD PRESSURE: 73 MMHG

## 2021-01-20 VITALS — DIASTOLIC BLOOD PRESSURE: 75 MMHG | SYSTOLIC BLOOD PRESSURE: 122 MMHG

## 2021-01-20 VITALS — DIASTOLIC BLOOD PRESSURE: 65 MMHG | SYSTOLIC BLOOD PRESSURE: 126 MMHG

## 2021-01-20 VITALS — DIASTOLIC BLOOD PRESSURE: 60 MMHG | SYSTOLIC BLOOD PRESSURE: 132 MMHG

## 2021-01-20 VITALS — DIASTOLIC BLOOD PRESSURE: 64 MMHG | SYSTOLIC BLOOD PRESSURE: 131 MMHG

## 2021-01-20 VITALS — DIASTOLIC BLOOD PRESSURE: 74 MMHG | SYSTOLIC BLOOD PRESSURE: 144 MMHG

## 2021-01-20 VITALS — DIASTOLIC BLOOD PRESSURE: 69 MMHG | SYSTOLIC BLOOD PRESSURE: 149 MMHG

## 2021-01-20 VITALS — SYSTOLIC BLOOD PRESSURE: 126 MMHG | DIASTOLIC BLOOD PRESSURE: 65 MMHG

## 2021-01-20 VITALS — SYSTOLIC BLOOD PRESSURE: 134 MMHG | DIASTOLIC BLOOD PRESSURE: 72 MMHG

## 2021-01-20 VITALS — SYSTOLIC BLOOD PRESSURE: 121 MMHG | DIASTOLIC BLOOD PRESSURE: 69 MMHG

## 2021-01-20 VITALS — DIASTOLIC BLOOD PRESSURE: 70 MMHG | SYSTOLIC BLOOD PRESSURE: 132 MMHG

## 2021-01-20 VITALS — SYSTOLIC BLOOD PRESSURE: 138 MMHG | DIASTOLIC BLOOD PRESSURE: 61 MMHG

## 2021-01-20 VITALS — SYSTOLIC BLOOD PRESSURE: 129 MMHG | DIASTOLIC BLOOD PRESSURE: 73 MMHG

## 2021-01-20 VITALS — DIASTOLIC BLOOD PRESSURE: 71 MMHG | SYSTOLIC BLOOD PRESSURE: 139 MMHG

## 2021-01-20 VITALS — SYSTOLIC BLOOD PRESSURE: 131 MMHG | DIASTOLIC BLOOD PRESSURE: 71 MMHG

## 2021-01-20 VITALS — SYSTOLIC BLOOD PRESSURE: 138 MMHG | DIASTOLIC BLOOD PRESSURE: 72 MMHG

## 2021-01-20 VITALS — SYSTOLIC BLOOD PRESSURE: 118 MMHG | DIASTOLIC BLOOD PRESSURE: 86 MMHG

## 2021-01-20 VITALS — SYSTOLIC BLOOD PRESSURE: 134 MMHG | DIASTOLIC BLOOD PRESSURE: 79 MMHG

## 2021-01-20 VITALS — SYSTOLIC BLOOD PRESSURE: 134 MMHG | DIASTOLIC BLOOD PRESSURE: 66 MMHG

## 2021-01-20 VITALS — DIASTOLIC BLOOD PRESSURE: 63 MMHG | SYSTOLIC BLOOD PRESSURE: 116 MMHG

## 2021-01-20 VITALS — DIASTOLIC BLOOD PRESSURE: 50 MMHG | SYSTOLIC BLOOD PRESSURE: 140 MMHG

## 2021-01-20 VITALS — DIASTOLIC BLOOD PRESSURE: 66 MMHG | SYSTOLIC BLOOD PRESSURE: 140 MMHG

## 2021-01-20 VITALS — SYSTOLIC BLOOD PRESSURE: 118 MMHG | DIASTOLIC BLOOD PRESSURE: 68 MMHG

## 2021-01-20 VITALS — DIASTOLIC BLOOD PRESSURE: 74 MMHG | SYSTOLIC BLOOD PRESSURE: 118 MMHG

## 2021-01-20 VITALS — SYSTOLIC BLOOD PRESSURE: 146 MMHG | DIASTOLIC BLOOD PRESSURE: 70 MMHG

## 2021-01-20 LAB
ALBUMIN FLD-MCNC: 2.2 G/DL (ref 3.4–5)
ANION GAP SERPL CALCULATED.3IONS-SCNC: 10.4 MMOL/L (ref 8–16)
ANION GAP SERPL CALCULATED.3IONS-SCNC: 9.2 MMOL/L (ref 8–16)
AST SERPL-CCNC: 118 U/L (ref 15–37)
BILIRUB DIRECT SERPL-MCNC: 1.8 MG/DL (ref 0–0.3)
BILIRUB SERPL-MCNC: 2.6 MG/DL (ref 0–1)
BUN SERPL-MCNC: 88 MG/DL (ref 7–18)
BUN SERPL-MCNC: 89 MG/DL (ref 7–18)
CHLORIDE SERPL-SCNC: 95 MMOL/L (ref 98–107)
CHLORIDE SERPL-SCNC: 95 MMOL/L (ref 98–107)
CO2 SERPL-SCNC: 36.1 MMOL/L (ref 21–32)
CO2 SERPL-SCNC: 37.5 MMOL/L (ref 21–32)
CREAT SERPL-MCNC: 1.8 MG/DL (ref 0.6–1.3)
CREAT SERPL-MCNC: 1.9 MG/DL (ref 0.6–1.3)
GFR SERPL CREATININE-BSD FRML MDRD: 47 ML/MIN (ref 90–?)
GFR SERPL CREATININE-BSD FRML MDRD: 50 ML/MIN (ref 90–?)
GLUCOSE SERPL-MCNC: 291 MG/DL (ref 74–106)
GLUCOSE SERPL-MCNC: 309 MG/DL (ref 74–106)
POTASSIUM SERPL-SCNC: 2.9 MMOL/L (ref 3.5–5.1)
POTASSIUM SERPL-SCNC: 3.3 MMOL/L (ref 3.5–5.1)
SODIUM SERPL-SCNC: 137 MMOL/L (ref 136–145)
SODIUM SERPL-SCNC: 140 MMOL/L (ref 136–145)

## 2021-01-20 RX ADMIN — NITROGLYCERIN SCH GM: 20 OINTMENT TOPICAL at 06:27

## 2021-01-20 RX ADMIN — INSULIN LISPRO PRN UNITS: 100 INJECTION, SOLUTION INTRAVENOUS; SUBCUTANEOUS at 01:01

## 2021-01-20 RX ADMIN — PROPOFOL PRN MLS/HR: 10 INJECTION, EMULSION INTRAVENOUS at 06:26

## 2021-01-20 RX ADMIN — PANTOPRAZOLE SODIUM SCH MG: 40 INJECTION, POWDER, FOR SOLUTION INTRAVENOUS at 08:29

## 2021-01-20 RX ADMIN — ATORVASTATIN CALCIUM SCH MG: 20 TABLET, FILM COATED ORAL at 08:29

## 2021-01-20 RX ADMIN — POTASSIUM CHLORIDE PRN MLS/HR: 200 INJECTION, SOLUTION INTRAVENOUS at 12:10

## 2021-01-20 RX ADMIN — DEXTROSE SCH MLS/HR: 50 INJECTION, SOLUTION INTRAVENOUS at 08:21

## 2021-01-20 RX ADMIN — SODIUM CHLORIDE SCH MLS/HR: 0.45 INJECTION, SOLUTION INTRAVENOUS at 21:30

## 2021-01-20 RX ADMIN — APIXABAN SCH MG: 2.5 TABLET, FILM COATED ORAL at 08:22

## 2021-01-20 RX ADMIN — APIXABAN SCH MG: 2.5 TABLET, FILM COATED ORAL at 21:00

## 2021-01-20 RX ADMIN — NITROGLYCERIN SCH GM: 20 OINTMENT TOPICAL at 00:47

## 2021-01-20 RX ADMIN — NITROGLYCERIN SCH GM: 20 OINTMENT TOPICAL at 17:47

## 2021-01-20 RX ADMIN — NITROGLYCERIN SCH GM: 20 OINTMENT TOPICAL at 11:38

## 2021-01-20 RX ADMIN — INSULIN LISPRO PRN UNITS: 100 INJECTION, SOLUTION INTRAVENOUS; SUBCUTANEOUS at 06:26

## 2021-01-20 RX ADMIN — PROPOFOL PRN MLS/HR: 10 INJECTION, EMULSION INTRAVENOUS at 13:40

## 2021-01-20 RX ADMIN — SODIUM CHLORIDE SCH MLS/HR: 9 INJECTION, SOLUTION INTRAVENOUS at 13:10

## 2021-01-20 RX ADMIN — INSULIN LISPRO PRN UNITS: 100 INJECTION, SOLUTION INTRAVENOUS; SUBCUTANEOUS at 12:42

## 2021-01-20 RX ADMIN — INSULIN LISPRO PRN UNITS: 100 INJECTION, SOLUTION INTRAVENOUS; SUBCUTANEOUS at 18:47

## 2021-01-20 RX ADMIN — DEXTROSE SCH MLS/HR: 50 INJECTION, SOLUTION INTRAVENOUS at 21:00

## 2021-01-20 RX ADMIN — FUROSEMIDE SCH MG: 10 INJECTION, SOLUTION INTRAMUSCULAR; INTRAVENOUS at 21:00

## 2021-01-20 RX ADMIN — NICOTINE SCH MG: 21 PATCH, EXTENDED RELEASE TOPICAL at 08:30

## 2021-01-20 RX ADMIN — DEXAMETHASONE SODIUM PHOSPHATE SCH MG: 4 INJECTION, SOLUTION INTRAMUSCULAR; INTRAVENOUS at 13:15

## 2021-01-20 NOTE — NUR
Looked through her chart and only found \"incontinence, unspecified\". I spoke with Dr. Alex Casas about the different types, PT said she only has the issue when she cough's or sneezes.  Informed pt this is called Stress incontinence and the ICD 10 code is N39.3 Started tube feeding at 10cc/hr; residual was 25 cc

## 2021-01-20 NOTE — NUR
1/20/21 RD FOLLOW UP COMPLETED



PLEASE REFER TO NUTRITION ASSESSMENT UNDER CARE ACTIVITY FOR ESTIMATED NUTRITIONAL NEEDS. 



1. CONTINUE GLUCERNA 1.2 @ GOAL RATE OF 65 ML/HR X 24 HR

- THIS WILL PROVIDE 1872 KCAL AND 94 G OF PROTEIN MEETING 99% OF ESTIMATED KCAL NEEDS AND 
83% OF ESTIMATED PROTEIN NEEDS

2. RECOMMEND FREE WATER FLUSH 100 ML Q6H 

3. RD TO FOLLOW UP IN 2-3 DAYS, HIGH RISK



DANIEL SAVAGE RD

## 2021-01-21 VITALS — DIASTOLIC BLOOD PRESSURE: 56 MMHG | SYSTOLIC BLOOD PRESSURE: 133 MMHG

## 2021-01-21 VITALS — DIASTOLIC BLOOD PRESSURE: 93 MMHG | SYSTOLIC BLOOD PRESSURE: 121 MMHG

## 2021-01-21 VITALS — SYSTOLIC BLOOD PRESSURE: 139 MMHG | DIASTOLIC BLOOD PRESSURE: 76 MMHG

## 2021-01-21 VITALS — DIASTOLIC BLOOD PRESSURE: 75 MMHG | SYSTOLIC BLOOD PRESSURE: 131 MMHG

## 2021-01-21 VITALS — SYSTOLIC BLOOD PRESSURE: 134 MMHG | DIASTOLIC BLOOD PRESSURE: 74 MMHG

## 2021-01-21 VITALS — SYSTOLIC BLOOD PRESSURE: 119 MMHG | DIASTOLIC BLOOD PRESSURE: 78 MMHG

## 2021-01-21 VITALS — SYSTOLIC BLOOD PRESSURE: 122 MMHG | DIASTOLIC BLOOD PRESSURE: 92 MMHG

## 2021-01-21 VITALS — DIASTOLIC BLOOD PRESSURE: 77 MMHG | SYSTOLIC BLOOD PRESSURE: 141 MMHG

## 2021-01-21 VITALS — DIASTOLIC BLOOD PRESSURE: 83 MMHG | SYSTOLIC BLOOD PRESSURE: 137 MMHG

## 2021-01-21 VITALS — SYSTOLIC BLOOD PRESSURE: 133 MMHG | DIASTOLIC BLOOD PRESSURE: 74 MMHG

## 2021-01-21 VITALS — SYSTOLIC BLOOD PRESSURE: 112 MMHG | DIASTOLIC BLOOD PRESSURE: 78 MMHG

## 2021-01-21 VITALS — DIASTOLIC BLOOD PRESSURE: 67 MMHG | SYSTOLIC BLOOD PRESSURE: 118 MMHG

## 2021-01-21 VITALS — DIASTOLIC BLOOD PRESSURE: 81 MMHG | SYSTOLIC BLOOD PRESSURE: 138 MMHG

## 2021-01-21 VITALS — SYSTOLIC BLOOD PRESSURE: 134 MMHG | DIASTOLIC BLOOD PRESSURE: 71 MMHG

## 2021-01-21 VITALS — DIASTOLIC BLOOD PRESSURE: 72 MMHG | SYSTOLIC BLOOD PRESSURE: 137 MMHG

## 2021-01-21 VITALS — SYSTOLIC BLOOD PRESSURE: 138 MMHG | DIASTOLIC BLOOD PRESSURE: 76 MMHG

## 2021-01-21 VITALS — DIASTOLIC BLOOD PRESSURE: 79 MMHG | SYSTOLIC BLOOD PRESSURE: 101 MMHG

## 2021-01-21 VITALS — SYSTOLIC BLOOD PRESSURE: 121 MMHG | DIASTOLIC BLOOD PRESSURE: 89 MMHG

## 2021-01-21 VITALS — DIASTOLIC BLOOD PRESSURE: 73 MMHG | SYSTOLIC BLOOD PRESSURE: 139 MMHG

## 2021-01-21 VITALS — DIASTOLIC BLOOD PRESSURE: 92 MMHG | SYSTOLIC BLOOD PRESSURE: 124 MMHG

## 2021-01-21 VITALS — DIASTOLIC BLOOD PRESSURE: 85 MMHG | SYSTOLIC BLOOD PRESSURE: 111 MMHG

## 2021-01-21 VITALS — SYSTOLIC BLOOD PRESSURE: 118 MMHG | DIASTOLIC BLOOD PRESSURE: 87 MMHG

## 2021-01-21 VITALS — SYSTOLIC BLOOD PRESSURE: 132 MMHG | DIASTOLIC BLOOD PRESSURE: 77 MMHG

## 2021-01-21 VITALS — DIASTOLIC BLOOD PRESSURE: 74 MMHG | SYSTOLIC BLOOD PRESSURE: 133 MMHG

## 2021-01-21 VITALS — SYSTOLIC BLOOD PRESSURE: 131 MMHG | DIASTOLIC BLOOD PRESSURE: 75 MMHG

## 2021-01-21 VITALS — SYSTOLIC BLOOD PRESSURE: 117 MMHG | DIASTOLIC BLOOD PRESSURE: 64 MMHG

## 2021-01-21 VITALS — SYSTOLIC BLOOD PRESSURE: 145 MMHG | DIASTOLIC BLOOD PRESSURE: 78 MMHG

## 2021-01-21 LAB
ALBUMIN FLD-MCNC: 2.3 G/DL (ref 3.4–5)
ANION GAP SERPL CALCULATED.3IONS-SCNC: 10.8 MMOL/L (ref 8–16)
AST SERPL-CCNC: 130 U/L (ref 15–37)
BASOPHILS # BLD AUTO: 0 K/UL (ref 0–0.22)
BASOPHILS NFR BLD AUTO: 0 % (ref 0–2)
BILIRUB SERPL-MCNC: 2.7 MG/DL (ref 0–1)
BUN SERPL-MCNC: 94 MG/DL (ref 7–18)
CHLORIDE SERPL-SCNC: 96 MMOL/L (ref 98–107)
CO2 SERPL-SCNC: 37.1 MMOL/L (ref 21–32)
CREAT SERPL-MCNC: 1.8 MG/DL (ref 0.6–1.3)
EOSINOPHIL # BLD AUTO: 0 K/UL (ref 0–0.4)
EOSINOPHIL NFR BLD AUTO: 0 % (ref 0–4)
ERYTHROCYTE [DISTWIDTH] IN BLOOD BY AUTOMATED COUNT: 15.3 % (ref 11.6–13.7)
GFR SERPL CREATININE-BSD FRML MDRD: 50 ML/MIN (ref 90–?)
GLUCOSE SERPL-MCNC: 343 MG/DL (ref 74–106)
HCT VFR BLD AUTO: 39.3 % (ref 36–52)
HGB BLD-MCNC: 12.8 G/DL (ref 12–18)
LYMPHOCYTES # BLD AUTO: 0.5 K/UL (ref 2–11.5)
LYMPHOCYTES NFR BLD AUTO: 2.9 % (ref 20.5–51.1)
MCH RBC QN AUTO: 29 PG (ref 27–31)
MCHC RBC AUTO-ENTMCNC: 33 G/DL (ref 33–37)
MCV RBC AUTO: 87.8 FL (ref 80–94)
MONOCYTES # BLD AUTO: 1.3 K/UL (ref 0.8–1)
MONOCYTES NFR BLD AUTO: 7.5 % (ref 1.7–9.3)
NEUTROPHILS # BLD AUTO: 15.4 K/UL (ref 1.8–7.7)
NEUTROPHILS NFR BLD AUTO: 89.6 % (ref 42.2–75.2)
PLATELET # BLD AUTO: 346 K/UL (ref 140–450)
POTASSIUM SERPL-SCNC: 2.9 MMOL/L (ref 3.5–5.1)
RBC # BLD AUTO: 4.47 MIL/UL (ref 4.2–6.1)
SODIUM SERPL-SCNC: 141 MMOL/L (ref 136–145)
WBC # BLD AUTO: 17.2 K/UL (ref 4.8–10.8)

## 2021-01-21 RX ADMIN — PROPOFOL PRN MLS/HR: 10 INJECTION, EMULSION INTRAVENOUS at 13:45

## 2021-01-21 RX ADMIN — Medication SCH DEV: at 17:23

## 2021-01-21 RX ADMIN — PROPOFOL PRN MLS/HR: 10 INJECTION, EMULSION INTRAVENOUS at 09:10

## 2021-01-21 RX ADMIN — ALBUTEROL SULFATE SCH GM: 90 AEROSOL, METERED RESPIRATORY (INHALATION) at 18:00

## 2021-01-21 RX ADMIN — PROPOFOL PRN MLS/HR: 10 INJECTION, EMULSION INTRAVENOUS at 22:59

## 2021-01-21 RX ADMIN — DEXTROSE SCH MLS/HR: 50 INJECTION, SOLUTION INTRAVENOUS at 21:00

## 2021-01-21 RX ADMIN — NITROGLYCERIN SCH GM: 20 OINTMENT TOPICAL at 06:00

## 2021-01-21 RX ADMIN — DEXAMETHASONE SODIUM PHOSPHATE SCH MG: 4 INJECTION, SOLUTION INTRAMUSCULAR; INTRAVENOUS at 14:13

## 2021-01-21 RX ADMIN — NICOTINE SCH MG: 21 PATCH, EXTENDED RELEASE TOPICAL at 08:46

## 2021-01-21 RX ADMIN — ATORVASTATIN CALCIUM SCH MG: 20 TABLET, FILM COATED ORAL at 08:45

## 2021-01-21 RX ADMIN — APIXABAN SCH MG: 2.5 TABLET, FILM COATED ORAL at 21:00

## 2021-01-21 RX ADMIN — SODIUM CHLORIDE SCH MLS/HR: 0.45 INJECTION, SOLUTION INTRAVENOUS at 21:30

## 2021-01-21 RX ADMIN — FUROSEMIDE SCH MG: 10 INJECTION, SOLUTION INTRAMUSCULAR; INTRAVENOUS at 08:45

## 2021-01-21 RX ADMIN — APIXABAN SCH MG: 2.5 TABLET, FILM COATED ORAL at 08:46

## 2021-01-21 RX ADMIN — FUROSEMIDE SCH MG: 10 INJECTION, SOLUTION INTRAMUSCULAR; INTRAVENOUS at 21:00

## 2021-01-21 RX ADMIN — DEXTROSE SCH MLS/HR: 50 INJECTION, SOLUTION INTRAVENOUS at 08:47

## 2021-01-21 RX ADMIN — PROPOFOL PRN MLS/HR: 10 INJECTION, EMULSION INTRAVENOUS at 04:30

## 2021-01-21 RX ADMIN — SODIUM CHLORIDE SCH MLS/HR: 9 INJECTION, SOLUTION INTRAVENOUS at 13:18

## 2021-01-21 RX ADMIN — INSULIN LISPRO PRN UNITS: 100 INJECTION, SOLUTION INTRAVENOUS; SUBCUTANEOUS at 12:24

## 2021-01-21 RX ADMIN — NITROGLYCERIN SCH GM: 20 OINTMENT TOPICAL at 00:11

## 2021-01-21 RX ADMIN — PANTOPRAZOLE SODIUM SCH MG: 40 INJECTION, POWDER, FOR SOLUTION INTRAVENOUS at 08:45

## 2021-01-21 RX ADMIN — ALBUTEROL SULFATE SCH GM: 90 AEROSOL, METERED RESPIRATORY (INHALATION) at 12:00

## 2021-01-21 RX ADMIN — INSULIN LISPRO PRN UNITS: 100 INJECTION, SOLUTION INTRAVENOUS; SUBCUTANEOUS at 17:25

## 2021-01-21 NOTE — NUR
DUE MEDICATIONS ADMINISTERED, PT TOLERATED WELL, RAMIREZ CARE DONE, CHG BATH GIVEN, WILL 
CONTINUE TO MONITOR.

## 2021-01-21 NOTE — NUR
ENDORSED PATIENT TO NIGHT SHIFT NURSE LAKSHMI FOR CONTINUITY OF CARE. PATIENT IS RASS-3, FLACC 
0, ETT TO VENT, AC/VC FIO2 40%, RATE 16, PEEP 5, OGT IN PLACE, VINCE PICC DOUBLE LUMEN CLEAN 
DRY AND INTACT, CARDIAC MONITOR IN PLACE, SAFETY MEASURES IN PLACE, BED IN LOW POSITION.

## 2021-01-21 NOTE — NUR
RECEIVED BEDSIDE REPORT FROM NIGHT SHIFT NURSE LAKSHMI RN, PT SEDATED RASS -3. DRY WEIGHT 
111KG. PT ON ETT TO VENT ACVC FIO2 40%, RATE 16, PEEP 5, TOLERATING WELL SATURATING @ 94%, 
PICC LINE TO LEFTUPPER ARM, PATENT INTACT, INFUSING PROPOFOL @ 25MCG/KG/MIN, INFUSING WELL, 
R UPPER ARM MIDLINE, OCCLUDED, UNABLE TO FLUSH, PERIPHERAL IV TO R UPPER ARM 18G, PATENT 
INTACT, SL, R AC 18G PATENT INTACT SL. OGT IN PLACE WITH FEEDING, TOLERATING WELL, PLACEMENT 
CONFIRMED WITH AUSCULTATION, RESIDUAL 30ML NOTED, RUNNING GLUCERNA @ 30ML/HR, WITH FWF. 
RAMIREZ CATH IN PLACE DRAINING TO GRAVITY. INITIAL ASSESSMENT DONE, ALL SAFETY PRECAUTION MET, 
CALL LIGHT WITHIN REACH, WILL CONTINUE TO MONITOR.

## 2021-01-21 NOTE — NUR
CHECKED BLOOD SUGAR, 305, COVERED 8 UNITS LISPRO PER MD RODRIGUEZING SCALE, WILL CONTINUE TO 
MONITOR.

## 2021-01-21 NOTE — NUR
CHECKED BLOOD GLUCOSE, 322, COVERED 8 UNITS LISPRO PER MD SLIDING SCALE, WILL MONITOR FOR 
S/S OF HYPOGLYCEMIA, SAFETY MEASURES IN PLACE, BED IN LOW POSITION, WILL CONTINUE TO 
MONITOR.

## 2021-01-21 NOTE — NUR
PT STILL HAVING SMALL SEIZURES, ATIVAN GIVEN, WILL CONTINUE TO MONITOR. Well-developed, well nourished

## 2021-01-21 NOTE — NUR
DR CARO AT [facility]

-------------------------------------------------------------------------------

Addendum: 01/21/21 at 1212 by Loly Murray RN

-------------------------------------------------------------------------------

PER DR CARO TO HOLD PROPOFOL FOR RIGHT NOW TO SEE IF PT IS ABLE TO RESPOND. PROPOFOL STOPPED, 
WILL CONTINUE TO MONITOR.

## 2021-01-21 NOTE — NUR
NOTIFY DR CARO REGARDING PT HAS HX OF DM AND NO BLOOD GLUCO CHECKS, PER DR TO ORDER ONE AND 
HE ORDERED EEG TO STOP PROPOFOL DURING EEG. WILL ENDORSE AND CONTINUE TO MONTIOR.

## 2021-01-22 VITALS — SYSTOLIC BLOOD PRESSURE: 131 MMHG | DIASTOLIC BLOOD PRESSURE: 72 MMHG

## 2021-01-22 VITALS — SYSTOLIC BLOOD PRESSURE: 132 MMHG | DIASTOLIC BLOOD PRESSURE: 73 MMHG

## 2021-01-22 VITALS — DIASTOLIC BLOOD PRESSURE: 73 MMHG | SYSTOLIC BLOOD PRESSURE: 122 MMHG

## 2021-01-22 VITALS — SYSTOLIC BLOOD PRESSURE: 113 MMHG | DIASTOLIC BLOOD PRESSURE: 76 MMHG

## 2021-01-22 VITALS — SYSTOLIC BLOOD PRESSURE: 125 MMHG | DIASTOLIC BLOOD PRESSURE: 93 MMHG

## 2021-01-22 VITALS — DIASTOLIC BLOOD PRESSURE: 92 MMHG | SYSTOLIC BLOOD PRESSURE: 140 MMHG

## 2021-01-22 VITALS — SYSTOLIC BLOOD PRESSURE: 124 MMHG | DIASTOLIC BLOOD PRESSURE: 78 MMHG

## 2021-01-22 VITALS — DIASTOLIC BLOOD PRESSURE: 87 MMHG | SYSTOLIC BLOOD PRESSURE: 146 MMHG

## 2021-01-22 VITALS — DIASTOLIC BLOOD PRESSURE: 100 MMHG | SYSTOLIC BLOOD PRESSURE: 127 MMHG

## 2021-01-22 VITALS — SYSTOLIC BLOOD PRESSURE: 108 MMHG | DIASTOLIC BLOOD PRESSURE: 62 MMHG

## 2021-01-22 VITALS — SYSTOLIC BLOOD PRESSURE: 105 MMHG | DIASTOLIC BLOOD PRESSURE: 62 MMHG

## 2021-01-22 VITALS — SYSTOLIC BLOOD PRESSURE: 122 MMHG | DIASTOLIC BLOOD PRESSURE: 66 MMHG

## 2021-01-22 VITALS — DIASTOLIC BLOOD PRESSURE: 74 MMHG | SYSTOLIC BLOOD PRESSURE: 124 MMHG

## 2021-01-22 VITALS — DIASTOLIC BLOOD PRESSURE: 74 MMHG | SYSTOLIC BLOOD PRESSURE: 122 MMHG

## 2021-01-22 VITALS — SYSTOLIC BLOOD PRESSURE: 108 MMHG | DIASTOLIC BLOOD PRESSURE: 78 MMHG

## 2021-01-22 VITALS — DIASTOLIC BLOOD PRESSURE: 78 MMHG | SYSTOLIC BLOOD PRESSURE: 122 MMHG

## 2021-01-22 VITALS — SYSTOLIC BLOOD PRESSURE: 130 MMHG | DIASTOLIC BLOOD PRESSURE: 84 MMHG

## 2021-01-22 VITALS — DIASTOLIC BLOOD PRESSURE: 90 MMHG | SYSTOLIC BLOOD PRESSURE: 134 MMHG

## 2021-01-22 VITALS — SYSTOLIC BLOOD PRESSURE: 123 MMHG | DIASTOLIC BLOOD PRESSURE: 78 MMHG

## 2021-01-22 VITALS — SYSTOLIC BLOOD PRESSURE: 121 MMHG | DIASTOLIC BLOOD PRESSURE: 74 MMHG

## 2021-01-22 VITALS — SYSTOLIC BLOOD PRESSURE: 141 MMHG | DIASTOLIC BLOOD PRESSURE: 93 MMHG

## 2021-01-22 VITALS — DIASTOLIC BLOOD PRESSURE: 78 MMHG | SYSTOLIC BLOOD PRESSURE: 116 MMHG

## 2021-01-22 VITALS — SYSTOLIC BLOOD PRESSURE: 127 MMHG | DIASTOLIC BLOOD PRESSURE: 79 MMHG

## 2021-01-22 VITALS — DIASTOLIC BLOOD PRESSURE: 79 MMHG | SYSTOLIC BLOOD PRESSURE: 127 MMHG

## 2021-01-22 VITALS — DIASTOLIC BLOOD PRESSURE: 62 MMHG | SYSTOLIC BLOOD PRESSURE: 127 MMHG

## 2021-01-22 LAB
ALBUMIN FLD-MCNC: 2.4 G/DL (ref 3.4–5)
ANION GAP SERPL CALCULATED.3IONS-SCNC: 10.8 MMOL/L (ref 8–16)
AST SERPL-CCNC: 103 U/L (ref 15–37)
BASOPHILS # BLD AUTO: 0.2 K/UL (ref 0–0.22)
BASOPHILS NFR BLD AUTO: 0.8 % (ref 0–2)
BILIRUB SERPL-MCNC: 1.8 MG/DL (ref 0–1)
BUN SERPL-MCNC: 89 MG/DL (ref 7–18)
CHLORIDE SERPL-SCNC: 104 MMOL/L (ref 98–107)
CO2 SERPL-SCNC: 35.4 MMOL/L (ref 21–32)
CREAT SERPL-MCNC: 1.8 MG/DL (ref 0.6–1.3)
EOSINOPHIL # BLD AUTO: 0 K/UL (ref 0–0.4)
EOSINOPHIL NFR BLD AUTO: 0 % (ref 0–4)
ERYTHROCYTE [DISTWIDTH] IN BLOOD BY AUTOMATED COUNT: 15.9 % (ref 11.6–13.7)
GFR SERPL CREATININE-BSD FRML MDRD: 50 ML/MIN (ref 90–?)
GLUCOSE SERPL-MCNC: 315 MG/DL (ref 74–106)
HCT VFR BLD AUTO: 40.6 % (ref 36–52)
HGB BLD-MCNC: 13.1 G/DL (ref 12–18)
LYMPHOCYTES # BLD AUTO: 0.6 K/UL (ref 2–11.5)
LYMPHOCYTES NFR BLD AUTO: 2.7 % (ref 20.5–51.1)
MCH RBC QN AUTO: 29 PG (ref 27–31)
MCHC RBC AUTO-ENTMCNC: 32 G/DL (ref 33–37)
MCV RBC AUTO: 89.1 FL (ref 80–94)
MONOCYTES # BLD AUTO: 1.1 K/UL (ref 0.8–1)
MONOCYTES NFR BLD AUTO: 5.1 % (ref 1.7–9.3)
NEUTROPHILS # BLD AUTO: 20.1 K/UL (ref 1.8–7.7)
NEUTROPHILS NFR BLD AUTO: 91.4 % (ref 42.2–75.2)
PLATELET # BLD AUTO: 290 K/UL (ref 140–450)
POTASSIUM SERPL-SCNC: 4.2 MMOL/L (ref 3.5–5.1)
RBC # BLD AUTO: 4.56 MIL/UL (ref 4.2–6.1)
SODIUM SERPL-SCNC: 146 MMOL/L (ref 136–145)
WBC # BLD AUTO: 22 K/UL (ref 4.8–10.8)

## 2021-01-22 RX ADMIN — SODIUM CHLORIDE SCH MLS/HR: 9 INJECTION, SOLUTION INTRAVENOUS at 13:30

## 2021-01-22 RX ADMIN — ALBUTEROL SULFATE SCH GM: 90 AEROSOL, METERED RESPIRATORY (INHALATION) at 12:00

## 2021-01-22 RX ADMIN — NYSTATIN SCH GM: 100000 CREAM TOPICAL at 01:00

## 2021-01-22 RX ADMIN — APIXABAN SCH MG: 2.5 TABLET, FILM COATED ORAL at 21:21

## 2021-01-22 RX ADMIN — ALBUTEROL SULFATE SCH GM: 90 AEROSOL, METERED RESPIRATORY (INHALATION) at 00:00

## 2021-01-22 RX ADMIN — PANTOPRAZOLE SODIUM SCH MG: 40 INJECTION, POWDER, FOR SOLUTION INTRAVENOUS at 09:31

## 2021-01-22 RX ADMIN — ALBUTEROL SULFATE SCH GM: 90 AEROSOL, METERED RESPIRATORY (INHALATION) at 06:00

## 2021-01-22 RX ADMIN — Medication SCH DEV: at 06:00

## 2021-01-22 RX ADMIN — ATORVASTATIN CALCIUM SCH MG: 20 TABLET, FILM COATED ORAL at 09:31

## 2021-01-22 RX ADMIN — Medication SCH DEV: at 18:21

## 2021-01-22 RX ADMIN — APIXABAN SCH MG: 2.5 TABLET, FILM COATED ORAL at 09:31

## 2021-01-22 RX ADMIN — NYSTATIN SCH GM: 100000 CREAM TOPICAL at 12:07

## 2021-01-22 RX ADMIN — INSULIN LISPRO PRN UNITS: 100 INJECTION, SOLUTION INTRAVENOUS; SUBCUTANEOUS at 07:08

## 2021-01-22 RX ADMIN — Medication SCH DEV: at 12:04

## 2021-01-22 RX ADMIN — SODIUM CHLORIDE SCH MLS/HR: 0.45 INJECTION, SOLUTION INTRAVENOUS at 21:30

## 2021-01-22 RX ADMIN — INSULIN LISPRO PRN UNITS: 100 INJECTION, SOLUTION INTRAVENOUS; SUBCUTANEOUS at 12:05

## 2021-01-22 RX ADMIN — FUROSEMIDE SCH MG: 10 INJECTION, SOLUTION INTRAMUSCULAR; INTRAVENOUS at 21:20

## 2021-01-22 RX ADMIN — INSULIN LISPRO PRN UNITS: 100 INJECTION, SOLUTION INTRAVENOUS; SUBCUTANEOUS at 02:50

## 2021-01-22 RX ADMIN — DEXTROSE SCH MLS/HR: 50 INJECTION, SOLUTION INTRAVENOUS at 21:21

## 2021-01-22 RX ADMIN — PROPOFOL PRN MLS/HR: 10 INJECTION, EMULSION INTRAVENOUS at 18:24

## 2021-01-22 RX ADMIN — DEXTROSE SCH MLS/HR: 50 INJECTION, SOLUTION INTRAVENOUS at 09:31

## 2021-01-22 RX ADMIN — ALBUTEROL SULFATE SCH GM: 90 AEROSOL, METERED RESPIRATORY (INHALATION) at 18:00

## 2021-01-22 RX ADMIN — FUROSEMIDE SCH MG: 10 INJECTION, SOLUTION INTRAMUSCULAR; INTRAVENOUS at 09:31

## 2021-01-22 RX ADMIN — DEXTROSE SCH MLS/HR: 50 INJECTION, SOLUTION INTRAVENOUS at 14:18

## 2021-01-22 RX ADMIN — INSULIN LISPRO PRN UNITS: 100 INJECTION, SOLUTION INTRAVENOUS; SUBCUTANEOUS at 17:52

## 2021-01-22 RX ADMIN — Medication SCH DEV: at 00:00

## 2021-01-22 RX ADMIN — PROPOFOL PRN MLS/HR: 10 INJECTION, EMULSION INTRAVENOUS at 12:00

## 2021-01-22 RX ADMIN — DEXAMETHASONE SODIUM PHOSPHATE SCH MG: 4 INJECTION, SOLUTION INTRAMUSCULAR; INTRAVENOUS at 13:57

## 2021-01-22 RX ADMIN — Medication SCH DEV: at 23:45

## 2021-01-22 NOTE — NUR
NOTIFIED DR. ATKINS REGARDING ORDER OF KEPPRA THERE IS AN ORDER FOR 1000MG Q 8H AND 500MG 
Q12H, PER DR ATKINS TO JUST CHANGE IT TO THE 100MG Q8H. WILL PUT IN ORDER AND CONTINUE WITH 
ORDERS.

## 2021-01-22 NOTE — NUR
RECEIVED BEDSIDE REPORT FROM NIGHT SHIFT NURSE LAKSHMI RN, PT SEDATED RASS -3. DRY WEIGHT 
111KG, PT ON ETT TO VENT ACVC FIO2 40%, RATE 16, PEEP5, PICC LINE TO L UPPER ARM PATENT 
INTACT, INFUSING PROPOFOL @ 25MCG/KG/MIN, INFUSING WELL, IV TO DIPTI 18G SL PANTENT INTACT, 
RAC 18G SL PATENT INTACT, R UPPER ARM MIDLINE CLOTTED, OGT IN PLACE WITH FEEDINGS, 
TOLERATING WELL, RAMIREZ CATH IN PLACE DRAINING TO GRAVITY, INITIAL ASSESSMENT DONE, ALL 
SAFETY PRECAUTION MET, CALL LIGHT WITHIN REACH WILL CONTINUE TO MONITOR.

## 2021-01-22 NOTE — NUR
RECEIVED REPORT FROM DAY NURSE; PT SEDATED ON PROPOFOL DRIP, SEIZURE PRECAUTIONS IN PLACE. 
ETT TO VENT 98% SPO2, NO DISTRESS NOTED. SR ON MONITOR 70-80S, GEN EDEMA 2-3+ NOTED. ABD 
SOFT NON DISTENDED, OGT IN PLACE, FEEDINGS RUNNING, >50 ML RESIDUALS NOTED. RAMIREZ CATH IN 
PLACE CLEAR YELLOW URINE NOTED. SKIN INTACT; DISCOLORATION TO BILATERAL LOWER EXTREMITIES. 
PICC TO L UPPER ARM NOTED, MIDLINE TO R UPPER ARM IN PLACE, PERIPHERAL IV TO R UPPER ARM 18 
G PATENT INTACT.  BED LOCKED IN LOWEST POSITION. WILL CONTINUE TO OBSERVE.

## 2021-01-23 VITALS — SYSTOLIC BLOOD PRESSURE: 108 MMHG | DIASTOLIC BLOOD PRESSURE: 62 MMHG

## 2021-01-23 VITALS — DIASTOLIC BLOOD PRESSURE: 77 MMHG | SYSTOLIC BLOOD PRESSURE: 130 MMHG

## 2021-01-23 VITALS — DIASTOLIC BLOOD PRESSURE: 84 MMHG | SYSTOLIC BLOOD PRESSURE: 124 MMHG

## 2021-01-23 VITALS — SYSTOLIC BLOOD PRESSURE: 119 MMHG | DIASTOLIC BLOOD PRESSURE: 78 MMHG

## 2021-01-23 VITALS — DIASTOLIC BLOOD PRESSURE: 82 MMHG | SYSTOLIC BLOOD PRESSURE: 131 MMHG

## 2021-01-23 VITALS — SYSTOLIC BLOOD PRESSURE: 123 MMHG | DIASTOLIC BLOOD PRESSURE: 82 MMHG

## 2021-01-23 VITALS — SYSTOLIC BLOOD PRESSURE: 122 MMHG | DIASTOLIC BLOOD PRESSURE: 78 MMHG

## 2021-01-23 VITALS — DIASTOLIC BLOOD PRESSURE: 78 MMHG | SYSTOLIC BLOOD PRESSURE: 124 MMHG

## 2021-01-23 VITALS — SYSTOLIC BLOOD PRESSURE: 138 MMHG | DIASTOLIC BLOOD PRESSURE: 69 MMHG

## 2021-01-23 VITALS — SYSTOLIC BLOOD PRESSURE: 140 MMHG | DIASTOLIC BLOOD PRESSURE: 89 MMHG

## 2021-01-23 VITALS — SYSTOLIC BLOOD PRESSURE: 134 MMHG | DIASTOLIC BLOOD PRESSURE: 70 MMHG

## 2021-01-23 VITALS — SYSTOLIC BLOOD PRESSURE: 143 MMHG | DIASTOLIC BLOOD PRESSURE: 84 MMHG

## 2021-01-23 VITALS — DIASTOLIC BLOOD PRESSURE: 89 MMHG | SYSTOLIC BLOOD PRESSURE: 140 MMHG

## 2021-01-23 VITALS — SYSTOLIC BLOOD PRESSURE: 115 MMHG | DIASTOLIC BLOOD PRESSURE: 72 MMHG

## 2021-01-23 VITALS — SYSTOLIC BLOOD PRESSURE: 124 MMHG | DIASTOLIC BLOOD PRESSURE: 84 MMHG

## 2021-01-23 VITALS — DIASTOLIC BLOOD PRESSURE: 68 MMHG | SYSTOLIC BLOOD PRESSURE: 120 MMHG

## 2021-01-23 VITALS — DIASTOLIC BLOOD PRESSURE: 69 MMHG | SYSTOLIC BLOOD PRESSURE: 138 MMHG

## 2021-01-23 VITALS — SYSTOLIC BLOOD PRESSURE: 124 MMHG | DIASTOLIC BLOOD PRESSURE: 76 MMHG

## 2021-01-23 VITALS — SYSTOLIC BLOOD PRESSURE: 134 MMHG | DIASTOLIC BLOOD PRESSURE: 89 MMHG

## 2021-01-23 VITALS — DIASTOLIC BLOOD PRESSURE: 78 MMHG | SYSTOLIC BLOOD PRESSURE: 123 MMHG

## 2021-01-23 VITALS — DIASTOLIC BLOOD PRESSURE: 81 MMHG | SYSTOLIC BLOOD PRESSURE: 130 MMHG

## 2021-01-23 VITALS — DIASTOLIC BLOOD PRESSURE: 94 MMHG | SYSTOLIC BLOOD PRESSURE: 142 MMHG

## 2021-01-23 VITALS — DIASTOLIC BLOOD PRESSURE: 80 MMHG | SYSTOLIC BLOOD PRESSURE: 139 MMHG

## 2021-01-23 VITALS — DIASTOLIC BLOOD PRESSURE: 72 MMHG | SYSTOLIC BLOOD PRESSURE: 118 MMHG

## 2021-01-23 LAB
ALBUMIN FLD-MCNC: 2.3 G/DL (ref 3.4–5)
ANION GAP SERPL CALCULATED.3IONS-SCNC: 11 MMOL/L (ref 8–16)
AST SERPL-CCNC: 97 U/L (ref 15–37)
BASOPHILS # BLD AUTO: 0 K/UL (ref 0–0.22)
BASOPHILS NFR BLD AUTO: 0.1 % (ref 0–2)
BILIRUB SERPL-MCNC: 1.4 MG/DL (ref 0–1)
BUN SERPL-MCNC: 89 MG/DL (ref 7–18)
CHLORIDE SERPL-SCNC: 107 MMOL/L (ref 98–107)
CO2 SERPL-SCNC: 34.6 MMOL/L (ref 21–32)
CREAT SERPL-MCNC: 1.7 MG/DL (ref 0.6–1.3)
EOSINOPHIL # BLD AUTO: 0 K/UL (ref 0–0.4)
EOSINOPHIL NFR BLD AUTO: 0 % (ref 0–4)
ERYTHROCYTE [DISTWIDTH] IN BLOOD BY AUTOMATED COUNT: 15.9 % (ref 11.6–13.7)
GFR SERPL CREATININE-BSD FRML MDRD: 53 ML/MIN (ref 90–?)
GLUCOSE SERPL-MCNC: 339 MG/DL (ref 74–106)
HCT VFR BLD AUTO: 42.5 % (ref 36–52)
HGB BLD-MCNC: 13.5 G/DL (ref 12–18)
LYMPHOCYTES # BLD AUTO: 0.8 K/UL (ref 2–11.5)
LYMPHOCYTES NFR BLD AUTO: 3.9 % (ref 20.5–51.1)
MCH RBC QN AUTO: 28 PG (ref 27–31)
MCHC RBC AUTO-ENTMCNC: 32 G/DL (ref 33–37)
MCV RBC AUTO: 89.4 FL (ref 80–94)
MONOCYTES # BLD AUTO: 1.2 K/UL (ref 0.8–1)
MONOCYTES NFR BLD AUTO: 6.2 % (ref 1.7–9.3)
NEUTROPHILS # BLD AUTO: 17.5 K/UL (ref 1.8–7.7)
NEUTROPHILS NFR BLD AUTO: 89.8 % (ref 42.2–75.2)
PLATELET # BLD AUTO: 262 K/UL (ref 140–450)
POTASSIUM SERPL-SCNC: 3.6 MMOL/L (ref 3.5–5.1)
RBC # BLD AUTO: 4.75 MIL/UL (ref 4.2–6.1)
SODIUM SERPL-SCNC: 149 MMOL/L (ref 136–145)
WBC # BLD AUTO: 19.5 K/UL (ref 4.8–10.8)

## 2021-01-23 PROCEDURE — XW033E5 INTRODUCTION OF REMDESIVIR ANTI-INFECTIVE INTO PERIPHERAL VEIN, PERCUTANEOUS APPROACH, NEW TECHNOLOGY GROUP 5: ICD-10-PCS | Performed by: INTERNAL MEDICINE

## 2021-01-23 RX ADMIN — APIXABAN SCH MG: 2.5 TABLET, FILM COATED ORAL at 20:48

## 2021-01-23 RX ADMIN — INSULIN LISPRO PRN UNITS: 100 INJECTION, SOLUTION INTRAVENOUS; SUBCUTANEOUS at 06:47

## 2021-01-23 RX ADMIN — Medication SCH DEV: at 11:54

## 2021-01-23 RX ADMIN — DEXTROSE SCH MLS/HR: 50 INJECTION, SOLUTION INTRAVENOUS at 04:59

## 2021-01-23 RX ADMIN — APIXABAN SCH MG: 2.5 TABLET, FILM COATED ORAL at 09:43

## 2021-01-23 RX ADMIN — PANTOPRAZOLE SODIUM SCH MG: 40 INJECTION, POWDER, FOR SOLUTION INTRAVENOUS at 09:43

## 2021-01-23 RX ADMIN — FUROSEMIDE SCH MG: 10 INJECTION, SOLUTION INTRAMUSCULAR; INTRAVENOUS at 20:48

## 2021-01-23 RX ADMIN — FUROSEMIDE SCH MG: 10 INJECTION, SOLUTION INTRAMUSCULAR; INTRAVENOUS at 09:43

## 2021-01-23 RX ADMIN — DEXTROSE SCH MLS/HR: 50 INJECTION, SOLUTION INTRAVENOUS at 13:41

## 2021-01-23 RX ADMIN — PROPOFOL PRN MLS/HR: 10 INJECTION, EMULSION INTRAVENOUS at 02:40

## 2021-01-23 RX ADMIN — INSULIN LISPRO PRN UNITS: 100 INJECTION, SOLUTION INTRAVENOUS; SUBCUTANEOUS at 11:54

## 2021-01-23 RX ADMIN — DEXTROSE SCH MLS/HR: 50 INJECTION, SOLUTION INTRAVENOUS at 20:45

## 2021-01-23 RX ADMIN — DEXAMETHASONE SODIUM PHOSPHATE SCH MG: 4 INJECTION, SOLUTION INTRAMUSCULAR; INTRAVENOUS at 14:12

## 2021-01-23 RX ADMIN — PROPOFOL PRN MLS/HR: 10 INJECTION, EMULSION INTRAVENOUS at 09:12

## 2021-01-23 RX ADMIN — INSULIN LISPRO PRN UNITS: 100 INJECTION, SOLUTION INTRAVENOUS; SUBCUTANEOUS at 18:19

## 2021-01-23 RX ADMIN — ATORVASTATIN CALCIUM SCH MG: 20 TABLET, FILM COATED ORAL at 09:44

## 2021-01-23 RX ADMIN — SODIUM CHLORIDE SCH MLS/HR: 0.45 INJECTION, SOLUTION INTRAVENOUS at 21:30

## 2021-01-23 RX ADMIN — SODIUM CHLORIDE SCH MLS/HR: 9 INJECTION, SOLUTION INTRAVENOUS at 13:04

## 2021-01-23 RX ADMIN — Medication SCH DEV: at 18:18

## 2021-01-23 RX ADMIN — PROPOFOL PRN MLS/HR: 10 INJECTION, EMULSION INTRAVENOUS at 16:33

## 2021-01-23 RX ADMIN — Medication SCH DEV: at 06:46

## 2021-01-23 RX ADMIN — NYSTATIN SCH GM: 100000 CREAM TOPICAL at 13:04

## 2021-01-23 RX ADMIN — ALBUTEROL SULFATE SCH GM: 90 AEROSOL, METERED RESPIRATORY (INHALATION) at 06:00

## 2021-01-23 RX ADMIN — NYSTATIN SCH GM: 100000 CREAM TOPICAL at 00:17

## 2021-01-23 RX ADMIN — INSULIN LISPRO PRN UNITS: 100 INJECTION, SOLUTION INTRAVENOUS; SUBCUTANEOUS at 00:17

## 2021-01-23 RX ADMIN — PROPOFOL PRN MLS/HR: 10 INJECTION, EMULSION INTRAVENOUS at 21:07

## 2021-01-23 NOTE — NUR
STARTED A NEW BOTTLE OF OGT FEEDING GLUCERNA 1.2, RUNNING AT 60 ML/HR AND WATER FLUSH 100 
ML/Q6HR AS PER MD ORDER. CHANGED TO NEW TUBING.

## 2021-01-23 NOTE — NUR
CHECKED OGT RESIDUAL RECEIVED 65 ML WHITE RESIDUAL. ADMINISTERED SCHEDULED AM MEDS, FLUSHED 
BEFORE AND AFTER MEDS. PROVIDED HYGIENE CARE, REPOSITIONED PATIENT, OFFLOADED PRESSURE WITH 
PILLOWS. PATIENT TOLERATED FAIR. FLACC 0. RESPIRATION EVEN AND UNLABORED ON ETT TO VENT 
AV/VC FIO2 40%, RATE 16, PEEP 5, SPO2 AT 96% AT THIS TIME. PROPOFOL CONTINUED RUNNING AT 25 
MCG/KG/MIN AND NS TKO. SAFETY MEASURES IN PLACE. BED IN LOW POSITION, HOB ELEVATED 35 DEGREE 
AND BED LOCKED.

## 2021-01-23 NOTE — NUR
RECEIVED REPORT FROM DAY NURSE; PT SEDATED ON PROPOFOL DRIP, SEIZURE PRECAUTIONS IN PLACE; + 
GAG REFLEX ETT TO VENT 95% SPO2, NO DISTRESS NOTED. SR ON MONITOR 70-80S, GEN EDEMA 2-3+ 
NOTED. ABD SOFT NON DISTENDED, OGT IN PLACE, FEEDINGS RUNNING, >50 ML RESIDUALS NOTED. RAMIREZ 
CATH IN PLACE CLEAR YELLOW URINE NOTED. SKIN INTACT; DISCOLORATION TO BILATERAL LOWER 
EXTREMITIES, REDNESS TO AXILLARY AREA. PICC TO L UPPER ARM NOTED, MIDLINE TO R UPPER ARM IN 
PLACE, PERIPHERAL IVS TO R AC; R AND UPPER ARM 18 G PATENT INTACT.  BED LOCKED IN LOWEST 
POSITION. WILL CONTINUE TO OBSERVE.

## 2021-01-23 NOTE — NUR
BLOOD GLUCOSE 253, 6 UNIT HUMALOG GIVEN VIA SUBQ. PROVIDED ORAL CARE AND SUCTIONING, PATIENT 
TOLERATED FAIR, FLACC 0. SAFETY MEASURES IN PLACE. BED IN LOW POSITION, HOB ELEVATED 35 
DEGREE AND BED LOCKED.

## 2021-01-23 NOTE — NUR
RECEIVED ON A iDreamBooksAPE R860 VENTILATOR PLUGGED INTO RED OUTLET TOLERATING WELL WITHOUT 
ADVERSE REACTIONS NOTED TO AN ENDOTRACHEAL TUBE #7.5 SECURED AT 23cm TEETH/GUM LINE WITH AN 
ANCHOR FAST CUFF PRESSURE CHECKED AS NOTED AMBU BAG AT BEDSIDE STABLE GOOD CHEST RISE 
ENDOTRACHEAL SUCTION FOR MODERATE SEMI THICK YELLOW SECRETIONS AIRWAY PATENT

## 2021-01-23 NOTE — NUR
WITH ASSIST, REPOSITIONED PATIENT, PILLOWS USED TO OFFLOADED PRESSURE, ORAL CARE PROVIDED. 
PATIENT TOLERATED FAIR. FLACC 0. RESPIRATION EVEN AND UNLABORED ON ETT TO VENT FIO2 40%,FIO2 
AT 92% AT THIS TIME. SAFETY MEASURES IN PLACE. BED IN LOW POSITION, HOB ELEVATED 35 DEGREE, 
AND BED LOCKED.

## 2021-01-23 NOTE — NUR
(01/23/21) RD FOLLOW UP COMPLETED

PLEASE REFER TO NUTRITION PROGRESS NOTE UNDER CARE ACTIVITY FOR ESTIMATED NUTRITION NEEDS.



RD RECOMMENDATIONS:

1. CONTINUE GLUCERNA 1.2 @ GOAL RATE OF 65 ML/HR X 24 HR. THIS WILL PROVIDE 1872 KCAL AND 94 
G OF PROTEIN MEETING 99% OF ESTIMATED KCAL NEEDS AND 83% OF ESTIMATED PROTEIN NEEDS; 
ADEQUATE.

2. RECOMMEND FREE WATER FLUSH 100 ML Q6H (400 ML). 

3. RD TO FOLLOW UP IN 2-3 DAYS, HIGH RISK.



JYOTHI MAHER, MS, RDN

## 2021-01-23 NOTE — NUR
NO DISTRESS NOTED GOOD CHEST RISE ENDOTRACHEAL SUCTION FOR COPIOUS THICK YELLOW/TAN 
SECRETIONS AIRWAY PATENT

## 2021-01-24 VITALS — DIASTOLIC BLOOD PRESSURE: 55 MMHG | SYSTOLIC BLOOD PRESSURE: 94 MMHG

## 2021-01-24 VITALS — DIASTOLIC BLOOD PRESSURE: 78 MMHG | SYSTOLIC BLOOD PRESSURE: 109 MMHG

## 2021-01-24 VITALS — DIASTOLIC BLOOD PRESSURE: 67 MMHG | SYSTOLIC BLOOD PRESSURE: 98 MMHG

## 2021-01-24 VITALS — SYSTOLIC BLOOD PRESSURE: 103 MMHG | DIASTOLIC BLOOD PRESSURE: 75 MMHG

## 2021-01-24 VITALS — DIASTOLIC BLOOD PRESSURE: 16 MMHG | SYSTOLIC BLOOD PRESSURE: 92 MMHG

## 2021-01-24 VITALS — DIASTOLIC BLOOD PRESSURE: 65 MMHG | SYSTOLIC BLOOD PRESSURE: 107 MMHG

## 2021-01-24 VITALS — SYSTOLIC BLOOD PRESSURE: 110 MMHG | DIASTOLIC BLOOD PRESSURE: 55 MMHG

## 2021-01-24 VITALS — DIASTOLIC BLOOD PRESSURE: 76 MMHG | SYSTOLIC BLOOD PRESSURE: 108 MMHG

## 2021-01-24 VITALS — SYSTOLIC BLOOD PRESSURE: 100 MMHG | DIASTOLIC BLOOD PRESSURE: 67 MMHG

## 2021-01-24 VITALS — SYSTOLIC BLOOD PRESSURE: 100 MMHG | DIASTOLIC BLOOD PRESSURE: 72 MMHG

## 2021-01-24 VITALS — SYSTOLIC BLOOD PRESSURE: 118 MMHG | DIASTOLIC BLOOD PRESSURE: 57 MMHG

## 2021-01-24 VITALS — DIASTOLIC BLOOD PRESSURE: 70 MMHG | SYSTOLIC BLOOD PRESSURE: 103 MMHG

## 2021-01-24 VITALS — DIASTOLIC BLOOD PRESSURE: 55 MMHG | SYSTOLIC BLOOD PRESSURE: 92 MMHG

## 2021-01-24 VITALS — SYSTOLIC BLOOD PRESSURE: 107 MMHG | DIASTOLIC BLOOD PRESSURE: 73 MMHG

## 2021-01-24 VITALS — SYSTOLIC BLOOD PRESSURE: 105 MMHG | DIASTOLIC BLOOD PRESSURE: 66 MMHG

## 2021-01-24 VITALS — DIASTOLIC BLOOD PRESSURE: 64 MMHG | SYSTOLIC BLOOD PRESSURE: 95 MMHG

## 2021-01-24 VITALS — DIASTOLIC BLOOD PRESSURE: 63 MMHG | SYSTOLIC BLOOD PRESSURE: 124 MMHG

## 2021-01-24 VITALS — DIASTOLIC BLOOD PRESSURE: 70 MMHG | SYSTOLIC BLOOD PRESSURE: 104 MMHG

## 2021-01-24 VITALS — DIASTOLIC BLOOD PRESSURE: 63 MMHG | SYSTOLIC BLOOD PRESSURE: 103 MMHG

## 2021-01-24 VITALS — DIASTOLIC BLOOD PRESSURE: 66 MMHG | SYSTOLIC BLOOD PRESSURE: 95 MMHG

## 2021-01-24 VITALS — SYSTOLIC BLOOD PRESSURE: 107 MMHG | DIASTOLIC BLOOD PRESSURE: 74 MMHG

## 2021-01-24 VITALS — SYSTOLIC BLOOD PRESSURE: 106 MMHG | DIASTOLIC BLOOD PRESSURE: 66 MMHG

## 2021-01-24 VITALS — SYSTOLIC BLOOD PRESSURE: 107 MMHG | DIASTOLIC BLOOD PRESSURE: 76 MMHG

## 2021-01-24 VITALS — DIASTOLIC BLOOD PRESSURE: 50 MMHG | SYSTOLIC BLOOD PRESSURE: 87 MMHG

## 2021-01-24 VITALS — SYSTOLIC BLOOD PRESSURE: 108 MMHG | DIASTOLIC BLOOD PRESSURE: 70 MMHG

## 2021-01-24 VITALS — DIASTOLIC BLOOD PRESSURE: 72 MMHG | SYSTOLIC BLOOD PRESSURE: 106 MMHG

## 2021-01-24 VITALS — DIASTOLIC BLOOD PRESSURE: 64 MMHG | SYSTOLIC BLOOD PRESSURE: 110 MMHG

## 2021-01-24 VITALS — SYSTOLIC BLOOD PRESSURE: 90 MMHG | DIASTOLIC BLOOD PRESSURE: 63 MMHG

## 2021-01-24 VITALS — DIASTOLIC BLOOD PRESSURE: 71 MMHG | SYSTOLIC BLOOD PRESSURE: 96 MMHG

## 2021-01-24 LAB
ALBUMIN FLD-MCNC: 2.1 G/DL (ref 3.4–5)
ANION GAP SERPL CALCULATED.3IONS-SCNC: 12.4 MMOL/L (ref 8–16)
AST SERPL-CCNC: 232 U/L (ref 15–37)
BASOPHILS # BLD AUTO: 0 K/UL (ref 0–0.22)
BASOPHILS NFR BLD AUTO: 0.3 % (ref 0–2)
BILIRUB SERPL-MCNC: 1 MG/DL (ref 0–1)
BUN SERPL-MCNC: 100 MG/DL (ref 7–18)
CHLORIDE SERPL-SCNC: 109 MMOL/L (ref 98–107)
CO2 SERPL-SCNC: 36.3 MMOL/L (ref 21–32)
CREAT SERPL-MCNC: 2.1 MG/DL (ref 0.6–1.3)
EOSINOPHIL # BLD AUTO: 0 K/UL (ref 0–0.4)
EOSINOPHIL NFR BLD AUTO: 0.1 % (ref 0–4)
ERYTHROCYTE [DISTWIDTH] IN BLOOD BY AUTOMATED COUNT: 15.9 % (ref 11.6–13.7)
GFR SERPL CREATININE-BSD FRML MDRD: 42 ML/MIN (ref 90–?)
GLUCOSE SERPL-MCNC: 302 MG/DL (ref 74–106)
HCT VFR BLD AUTO: 43.6 % (ref 36–52)
HGB BLD-MCNC: 13.9 G/DL (ref 12–18)
LYMPHOCYTES # BLD AUTO: 0.8 K/UL (ref 2–11.5)
LYMPHOCYTES NFR BLD AUTO: 4.1 % (ref 20.5–51.1)
MCH RBC QN AUTO: 29 PG (ref 27–31)
MCHC RBC AUTO-ENTMCNC: 32 G/DL (ref 33–37)
MCV RBC AUTO: 89.9 FL (ref 80–94)
MONOCYTES # BLD AUTO: 1.5 K/UL (ref 0.8–1)
MONOCYTES NFR BLD AUTO: 7.9 % (ref 1.7–9.3)
NEUTROPHILS # BLD AUTO: 16.9 K/UL (ref 1.8–7.7)
NEUTROPHILS NFR BLD AUTO: 87.6 % (ref 42.2–75.2)
PLATELET # BLD AUTO: 236 K/UL (ref 140–450)
POTASSIUM SERPL-SCNC: 3.7 MMOL/L (ref 3.5–5.1)
RBC # BLD AUTO: 4.86 MIL/UL (ref 4.2–6.1)
SODIUM SERPL-SCNC: 154 MMOL/L (ref 136–145)
WBC # BLD AUTO: 19.2 K/UL (ref 4.8–10.8)

## 2021-01-24 RX ADMIN — APIXABAN SCH MG: 2.5 TABLET, FILM COATED ORAL at 21:30

## 2021-01-24 RX ADMIN — DEXAMETHASONE SODIUM PHOSPHATE SCH MG: 4 INJECTION, SOLUTION INTRAMUSCULAR; INTRAVENOUS at 13:01

## 2021-01-24 RX ADMIN — FOLIC ACID SCH MLS/HR: 5 INJECTION, SOLUTION INTRAMUSCULAR; INTRAVENOUS; SUBCUTANEOUS at 15:42

## 2021-01-24 RX ADMIN — Medication SCH DEV: at 12:54

## 2021-01-24 RX ADMIN — INSULIN LISPRO PRN UNITS: 100 INJECTION, SOLUTION INTRAVENOUS; SUBCUTANEOUS at 18:33

## 2021-01-24 RX ADMIN — PROPOFOL PRN MLS/HR: 10 INJECTION, EMULSION INTRAVENOUS at 10:11

## 2021-01-24 RX ADMIN — DEXTROSE SCH MLS/HR: 50 INJECTION, SOLUTION INTRAVENOUS at 13:01

## 2021-01-24 RX ADMIN — PANTOPRAZOLE SODIUM SCH MG: 40 INJECTION, POWDER, FOR SOLUTION INTRAVENOUS at 09:14

## 2021-01-24 RX ADMIN — DEXTROSE SCH MLS/HR: 50 INJECTION, SOLUTION INTRAVENOUS at 21:30

## 2021-01-24 RX ADMIN — INSULIN LISPRO PRN UNITS: 100 INJECTION, SOLUTION INTRAVENOUS; SUBCUTANEOUS at 13:01

## 2021-01-24 RX ADMIN — Medication SCH DEV: at 18:32

## 2021-01-24 RX ADMIN — NYSTATIN SCH GM: 100000 CREAM TOPICAL at 00:29

## 2021-01-24 RX ADMIN — DILTIAZEM HYDROCHLORIDE PRN MG: 5 INJECTION INTRAVENOUS at 05:47

## 2021-01-24 RX ADMIN — APIXABAN SCH MG: 2.5 TABLET, FILM COATED ORAL at 09:14

## 2021-01-24 RX ADMIN — INSULIN LISPRO PRN UNITS: 100 INJECTION, SOLUTION INTRAVENOUS; SUBCUTANEOUS at 00:29

## 2021-01-24 RX ADMIN — DILTIAZEM HYDROCHLORIDE PRN MG: 5 INJECTION INTRAVENOUS at 11:43

## 2021-01-24 RX ADMIN — Medication SCH DEV: at 05:45

## 2021-01-24 RX ADMIN — ATORVASTATIN CALCIUM SCH MG: 20 TABLET, FILM COATED ORAL at 09:15

## 2021-01-24 RX ADMIN — PROPOFOL PRN MLS/HR: 10 INJECTION, EMULSION INTRAVENOUS at 05:46

## 2021-01-24 RX ADMIN — DEXTROSE SCH MLS/HR: 50 INJECTION, SOLUTION INTRAVENOUS at 05:50

## 2021-01-24 RX ADMIN — METOPROLOL TARTRATE PRN MG: 5 INJECTION, SOLUTION INTRAVENOUS at 00:55

## 2021-01-24 RX ADMIN — FUROSEMIDE SCH MG: 10 INJECTION, SOLUTION INTRAMUSCULAR; INTRAVENOUS at 09:14

## 2021-01-24 RX ADMIN — Medication SCH DEV: at 00:29

## 2021-01-24 RX ADMIN — DEXTROSE SCH MLS/HR: 50 INJECTION, SOLUTION INTRAVENOUS at 05:47

## 2021-01-24 RX ADMIN — NYSTATIN SCH GM: 100000 CREAM TOPICAL at 13:00

## 2021-01-24 RX ADMIN — PROPOFOL PRN MLS/HR: 10 INJECTION, EMULSION INTRAVENOUS at 18:03

## 2021-01-24 RX ADMIN — DILTIAZEM HYDROCHLORIDE PRN MG: 5 INJECTION INTRAVENOUS at 05:37

## 2021-01-24 NOTE — NUR
CHECKED BLOOD GLUCOSE, 279, COVERED WITH 6 UNITS PER MD SLIDING SCALE, ADMINISTERED 
SCHEDULED AFTERNOON MEDS, CARDIAC MONITOR IN PLACE, PULSE OXIMETER IN PLACE, SAFETY MEASURES 
IN PLACE, BED IN LOW POSITION, WILL CONTINUE TO MONITOR.

## 2021-01-24 NOTE — NUR
DR. SANDERS ROUNDED ON PATIENT. UPDATED ON PATIENT STATUS AND IS AWARE OF THE OGT RESIDUAL 
BEING 300ML AND FEEDING BEING HELD. ORDERED D5W AT 75ML/HR.

## 2021-01-24 NOTE — NUR
ENDORSED CONTINUITY OF CARE TO LAKSHMI RAMOS. CARDIAC MONITOR IN PLACE, PULSE OXIMETER IN PLACE, 
RAMIREZ IN PLACE, PATIENT IN A COMFORTABLE POSITION, SAFETY MEASURES IN PLACE, BED IN LOW 
POSITION.

## 2021-01-24 NOTE — NUR
DR KARLA HARRIS; DR POWELL ON CALL; NOTIFIED MD ABOUT HR 130S AFIB AFLUTTER, NEW ORDERS 
GIVEN: METOPROLOL IVP 5 MG X1, CARDIZEM 10 MG IVP X2 DOSES, IF HR STILL>100 MAY START 
CARDIZEM DRIP TO KEEP HR>100. WILL CARRY OUT ORDERS, WILL CONTINUE TO OBSERVE.

## 2021-01-24 NOTE — NUR
RECEIVED CRITICAL LAB FOR  CR 2.1, [facility] AND ON CALL PHYSICIAN IS DR SANDERS. 
AWAITING FOR DR SANDERS TO CALL BACK.

## 2021-01-24 NOTE — NUR
RECEIVED REPORT FROM DAY NURSE; PT SEDATED ON PROPOFOL DRIP, SEIZURE PRECAUTIONS IN PLACE; + 
GAG REFLEX ETT TO VENT 95% SPO2, NO DISTRESS NOTED. AFLUTTER 3:1 CONDUCTION ON MONITOR 
70-80S, GEN EDEMA 2-3+ NOTED. ABD ROUND NON DISTENDED, OGT IN PLACE, FEEDINGS RUNNING, >50 
ML RESIDUALS NOTED. RAMIREZ CATH IN PLACE CLEAR YELLOW URINE NOTED. SKIN INTACT; DISCOLORATION 
TO BILATERAL LOWER EXTREMITIES, REDNESS TO AXILLARY AREA. PICC TO L UPPER ARM NOTED, MIDLINE 
TO R UPPER ARM IN PLACE, PERIPHERAL IVS TO R AC; R AND UPPER ARM 18 G PATENT INTACT. 
CARDIZEM DRIP @ 10 MG/HR.  BED LOCKED IN LOWEST POSITION. WILL CONTINUE TO OBSERVE.

## 2021-01-24 NOTE — NUR
RECEIVED REPORT FOR NIGHT SHIFT RN, LAKSHMI, FOR CONTINUITY OF CARE. RASS -3, FLACC 0, DRY 
WEIGHT 114KG, ETT TO VENT, AC/VS FIO2 40%, RATE 16, PEEP 5, SPO2 90%, OGT INTACT AND RUNNING 
GLUCERNA AT 65 ML, VINCE PICC CLEAN DRY AND INTACT RUNNING CARDIZEM AT 10MG/HR, PROPOFOL AT 
20MCG/KG/MIN, AND NS TKO AT 5ML/HR, DIPTI IV CLEAN DRY INTACT AND SALINE LOCKED, RAMIREZ CATH IN 
PLACE WITH YELLOW URINE IN BAG, CARDIAC MONITOR IN PLACE, PULSE OXIMETER IN PLACE, SAFETY 
MEASURES IN PLACE, BED IN LOW POSITION, WILL CONTINUE TO MONITOR.

## 2021-01-24 NOTE — NUR
ADMINISTER SCHEDULED AM MEDS, OGT IN PLACE, CHECKED RESIDUAL, 300ML, HELD FEEDING PER 
PROTOCOL, PROVIDED HYGIENE CARE, ORAL CARE, RAMIREZ CARE, AND CHG BATH, REPOSITIONED PATIENT, 
RECHECKED TEMPERATURE, 99.8, COOLING MEASURES STILL IN PLACE, CARDIAC MONITOR IN PLACE, 
PULSE OXIMETER IN PLACE, SAFETY MEASURES IN PLACE, BED IN LOW POSITION WILL CONTINUE TO 
MONITOR.

## 2021-01-24 NOTE — NUR
RECEIVED A CALL FROM SISTER IN LAW-[employer] [facility], PROVIDED UPDATE AND ANSWERED ALL HER 
QUESTIONS. PER TJ, THEY ARE NOT ABLE TO COME TOMORROW AS PLANNED FOR COMFORT CARE AND 
CHANGED IT TO WEDNESDAY 1/27. PER TJ, SHE WILL ALSO CONTACT THE HOUSE SUPERVISOR ON 
WEDNESDAY REGARDING THIS. WILL ENDORSE TO ONCOMING SHIFT. HOUSE SUPERVISOR EFFIE 
NOTIFIED.

## 2021-01-24 NOTE — NUR
TEMP AXILLARY 100.1, COOLING MEASURES APPLIED. PATIENT CONTINUE TO SHOW A- ON 
MONITOR, CARDIZEM DRIP IS CONTINUE RUNNING AT 10 MG/HG. SAFETY MEASURES IN PLACE.

## 2021-01-25 VITALS — SYSTOLIC BLOOD PRESSURE: 108 MMHG | DIASTOLIC BLOOD PRESSURE: 77 MMHG

## 2021-01-25 VITALS — DIASTOLIC BLOOD PRESSURE: 78 MMHG | SYSTOLIC BLOOD PRESSURE: 111 MMHG

## 2021-01-25 VITALS — SYSTOLIC BLOOD PRESSURE: 114 MMHG | DIASTOLIC BLOOD PRESSURE: 85 MMHG

## 2021-01-25 VITALS — SYSTOLIC BLOOD PRESSURE: 108 MMHG | DIASTOLIC BLOOD PRESSURE: 70 MMHG

## 2021-01-25 VITALS — SYSTOLIC BLOOD PRESSURE: 103 MMHG | DIASTOLIC BLOOD PRESSURE: 75 MMHG

## 2021-01-25 VITALS — DIASTOLIC BLOOD PRESSURE: 76 MMHG | SYSTOLIC BLOOD PRESSURE: 116 MMHG

## 2021-01-25 VITALS — SYSTOLIC BLOOD PRESSURE: 96 MMHG | DIASTOLIC BLOOD PRESSURE: 88 MMHG

## 2021-01-25 VITALS — DIASTOLIC BLOOD PRESSURE: 71 MMHG | SYSTOLIC BLOOD PRESSURE: 110 MMHG

## 2021-01-25 VITALS — DIASTOLIC BLOOD PRESSURE: 75 MMHG | SYSTOLIC BLOOD PRESSURE: 108 MMHG

## 2021-01-25 VITALS — DIASTOLIC BLOOD PRESSURE: 66 MMHG | SYSTOLIC BLOOD PRESSURE: 100 MMHG

## 2021-01-25 VITALS — SYSTOLIC BLOOD PRESSURE: 107 MMHG | DIASTOLIC BLOOD PRESSURE: 99 MMHG

## 2021-01-25 VITALS — DIASTOLIC BLOOD PRESSURE: 59 MMHG | SYSTOLIC BLOOD PRESSURE: 91 MMHG

## 2021-01-25 VITALS — DIASTOLIC BLOOD PRESSURE: 74 MMHG | SYSTOLIC BLOOD PRESSURE: 123 MMHG

## 2021-01-25 VITALS — DIASTOLIC BLOOD PRESSURE: 71 MMHG | SYSTOLIC BLOOD PRESSURE: 115 MMHG

## 2021-01-25 VITALS — DIASTOLIC BLOOD PRESSURE: 78 MMHG | SYSTOLIC BLOOD PRESSURE: 116 MMHG

## 2021-01-25 VITALS — DIASTOLIC BLOOD PRESSURE: 58 MMHG | SYSTOLIC BLOOD PRESSURE: 101 MMHG

## 2021-01-25 VITALS — SYSTOLIC BLOOD PRESSURE: 107 MMHG | DIASTOLIC BLOOD PRESSURE: 75 MMHG

## 2021-01-25 VITALS — DIASTOLIC BLOOD PRESSURE: 78 MMHG | SYSTOLIC BLOOD PRESSURE: 105 MMHG

## 2021-01-25 VITALS — SYSTOLIC BLOOD PRESSURE: 102 MMHG | DIASTOLIC BLOOD PRESSURE: 66 MMHG

## 2021-01-25 VITALS — DIASTOLIC BLOOD PRESSURE: 63 MMHG | SYSTOLIC BLOOD PRESSURE: 97 MMHG

## 2021-01-25 VITALS — SYSTOLIC BLOOD PRESSURE: 99 MMHG | DIASTOLIC BLOOD PRESSURE: 68 MMHG

## 2021-01-25 VITALS — SYSTOLIC BLOOD PRESSURE: 91 MMHG | DIASTOLIC BLOOD PRESSURE: 65 MMHG

## 2021-01-25 VITALS — DIASTOLIC BLOOD PRESSURE: 75 MMHG | SYSTOLIC BLOOD PRESSURE: 114 MMHG

## 2021-01-25 VITALS — SYSTOLIC BLOOD PRESSURE: 115 MMHG | DIASTOLIC BLOOD PRESSURE: 76 MMHG

## 2021-01-25 VITALS — DIASTOLIC BLOOD PRESSURE: 70 MMHG | SYSTOLIC BLOOD PRESSURE: 100 MMHG

## 2021-01-25 VITALS — SYSTOLIC BLOOD PRESSURE: 110 MMHG | DIASTOLIC BLOOD PRESSURE: 75 MMHG

## 2021-01-25 VITALS — SYSTOLIC BLOOD PRESSURE: 122 MMHG | DIASTOLIC BLOOD PRESSURE: 78 MMHG

## 2021-01-25 VITALS — SYSTOLIC BLOOD PRESSURE: 117 MMHG | DIASTOLIC BLOOD PRESSURE: 77 MMHG

## 2021-01-25 LAB
ALBUMIN FLD-MCNC: 1.8 G/DL (ref 3.4–5)
ANION GAP SERPL CALCULATED.3IONS-SCNC: 14 MMOL/L (ref 8–16)
AST SERPL-CCNC: 293 U/L (ref 15–37)
BASOPHILS # BLD AUTO: 0 K/UL (ref 0–0.22)
BASOPHILS NFR BLD AUTO: 0.1 % (ref 0–2)
BILIRUB SERPL-MCNC: 0.9 MG/DL (ref 0–1)
BUN SERPL-MCNC: 80 MG/DL (ref 7–18)
CHLORIDE SERPL-SCNC: 111 MMOL/L (ref 98–107)
CO2 SERPL-SCNC: 30.7 MMOL/L (ref 21–32)
CREAT SERPL-MCNC: 1.3 MG/DL (ref 0.6–1.3)
EOSINOPHIL # BLD AUTO: 0 K/UL (ref 0–0.4)
EOSINOPHIL NFR BLD AUTO: 0 % (ref 0–4)
ERYTHROCYTE [DISTWIDTH] IN BLOOD BY AUTOMATED COUNT: 15.6 % (ref 11.6–13.7)
GFR SERPL CREATININE-BSD FRML MDRD: 73 ML/MIN (ref 90–?)
GLUCOSE SERPL-MCNC: 323 MG/DL (ref 74–106)
HCT VFR BLD AUTO: 41.3 % (ref 36–52)
HGB BLD-MCNC: 13 G/DL (ref 12–18)
LYMPHOCYTES # BLD AUTO: 0.8 K/UL (ref 2–11.5)
LYMPHOCYTES NFR BLD AUTO: 4.1 % (ref 20.5–51.1)
MCH RBC QN AUTO: 29 PG (ref 27–31)
MCHC RBC AUTO-ENTMCNC: 32 G/DL (ref 33–37)
MCV RBC AUTO: 90.1 FL (ref 80–94)
MONOCYTES # BLD AUTO: 0.9 K/UL (ref 0.8–1)
MONOCYTES NFR BLD AUTO: 4.8 % (ref 1.7–9.3)
NEUTROPHILS # BLD AUTO: 16.9 K/UL (ref 1.8–7.7)
NEUTROPHILS NFR BLD AUTO: 91 % (ref 42.2–75.2)
PLATELET # BLD AUTO: 174 K/UL (ref 140–450)
POTASSIUM SERPL-SCNC: 2.7 MMOL/L (ref 3.5–5.1)
RBC # BLD AUTO: 4.58 MIL/UL (ref 4.2–6.1)
SODIUM SERPL-SCNC: 153 MMOL/L (ref 136–145)
WBC # BLD AUTO: 18.5 K/UL (ref 4.8–10.8)

## 2021-01-25 RX ADMIN — FOLIC ACID SCH MLS/HR: 5 INJECTION, SOLUTION INTRAMUSCULAR; INTRAVENOUS; SUBCUTANEOUS at 04:25

## 2021-01-25 RX ADMIN — ATORVASTATIN CALCIUM SCH MG: 20 TABLET, FILM COATED ORAL at 08:08

## 2021-01-25 RX ADMIN — Medication SCH DEV: at 12:49

## 2021-01-25 RX ADMIN — Medication SCH DEV: at 05:44

## 2021-01-25 RX ADMIN — DEXTROSE SCH MLS/HR: 50 INJECTION, SOLUTION INTRAVENOUS at 21:00

## 2021-01-25 RX ADMIN — Medication SCH DEV: at 17:48

## 2021-01-25 RX ADMIN — FOLIC ACID SCH MLS/HR: 5 INJECTION, SOLUTION INTRAMUSCULAR; INTRAVENOUS; SUBCUTANEOUS at 17:45

## 2021-01-25 RX ADMIN — APIXABAN SCH MG: 2.5 TABLET, FILM COATED ORAL at 08:10

## 2021-01-25 RX ADMIN — POTASSIUM CHLORIDE PRN MLS/HR: 200 INJECTION, SOLUTION INTRAVENOUS at 15:03

## 2021-01-25 RX ADMIN — Medication SCH DEV: at 00:00

## 2021-01-25 RX ADMIN — DEXAMETHASONE SODIUM PHOSPHATE SCH MG: 4 INJECTION, SOLUTION INTRAMUSCULAR; INTRAVENOUS at 14:42

## 2021-01-25 RX ADMIN — NYSTATIN SCH GM: 100000 CREAM TOPICAL at 01:00

## 2021-01-25 RX ADMIN — PROPOFOL PRN MLS/HR: 10 INJECTION, EMULSION INTRAVENOUS at 06:04

## 2021-01-25 RX ADMIN — POTASSIUM CHLORIDE PRN MLS/HR: 200 INJECTION, SOLUTION INTRAVENOUS at 08:04

## 2021-01-25 RX ADMIN — INSULIN LISPRO PRN UNITS: 100 INJECTION, SOLUTION INTRAVENOUS; SUBCUTANEOUS at 05:45

## 2021-01-25 RX ADMIN — PROPOFOL PRN MLS/HR: 10 INJECTION, EMULSION INTRAVENOUS at 10:07

## 2021-01-25 RX ADMIN — INSULIN LISPRO PRN UNITS: 100 INJECTION, SOLUTION INTRAVENOUS; SUBCUTANEOUS at 12:51

## 2021-01-25 RX ADMIN — DEXTROSE SCH MLS/HR: 50 INJECTION, SOLUTION INTRAVENOUS at 05:44

## 2021-01-25 RX ADMIN — DEXTROSE SCH MLS/HR: 50 INJECTION, SOLUTION INTRAVENOUS at 14:20

## 2021-01-25 RX ADMIN — NYSTATIN SCH GM: 100000 CREAM TOPICAL at 13:00

## 2021-01-25 RX ADMIN — INSULIN LISPRO PRN UNITS: 100 INJECTION, SOLUTION INTRAVENOUS; SUBCUTANEOUS at 17:46

## 2021-01-25 RX ADMIN — PANTOPRAZOLE SODIUM SCH MG: 40 INJECTION, POWDER, FOR SOLUTION INTRAVENOUS at 08:07

## 2021-01-25 NOTE — NUR
Kael Cortez (brother of the patient )  wife Rayna called and said that they agreed for 
terminal extubation on wednesday Jan 27 at 0900.They want to come and see the pt before 
terminal extubation.[facility] nurse maura aware and will endorse to the incoming night 
nurse.

## 2021-01-25 NOTE — NUR
received pt from night nurse, pt still vented via ETT, saturating from . On levophed 
drip for the BP, cardizem drip at 10mgs/hr.

With OGT intact, to continuous feeding. Gale catheter in place to yellow output.

Skin discoloration on the lower extremities.continue to observe

## 2021-01-25 NOTE — NUR
dr bah here,seen pt,noted all parameters, K-2.7 , 20 meq KCL replacement infusing, 
verbally ordered to stop propofol infusion.

## 2021-01-25 NOTE — NUR
surge documentationend of shift 

No significant change from last assesment.Assissted with ADL and hourly rounding done.All 
orders reviewed and CARRIED OUT.

pLS CONTINUE TO MONITOR

## 2021-01-25 NOTE — NUR
RECEIVED REPORT FROM DAY NURSE; PT SEDATED ON PROPOFOL DRIP, SEIZURE PRECAUTIONS IN PLACE; + 
GAG REFLEX ETT TO VENT 95% SPO2, NO DISTRESS NOTED. AFLUTTER 3:1 CONDUCTION ON MONITOR 
90S-100S, GEN EDEMA 2-3+ NOTED. ABD ROUND NON DISTENDED, OGT IN PLACE, FEEDINGS RUNNING, >50 
ML RESIDUALS NOTED. RAMIREZ CATH IN PLACE CLEAR YELLOW URINE NOTED. SKIN INTACT; DISCOLORATION 
TO BILATERAL LOWER EXTREMITIES, REDNESS TO AXILLARY AREA. PICC TO L UPPER ARM NOTED, MIDLINE 
TO R UPPER ARM IN PLACE, PERIPHERAL IVS TO R AC; R AND UPPER ARM 18 G PATENT INTACT. 
CARDIZEM DRIP @ 15 MG/HR.  BED LOCKED IN LOWEST POSITION. WILL CONTINUE TO OBSERVE.

## 2021-01-26 VITALS — DIASTOLIC BLOOD PRESSURE: 54 MMHG | SYSTOLIC BLOOD PRESSURE: 109 MMHG

## 2021-01-26 VITALS — SYSTOLIC BLOOD PRESSURE: 100 MMHG | DIASTOLIC BLOOD PRESSURE: 58 MMHG

## 2021-01-26 VITALS — DIASTOLIC BLOOD PRESSURE: 41 MMHG | SYSTOLIC BLOOD PRESSURE: 105 MMHG

## 2021-01-26 VITALS — DIASTOLIC BLOOD PRESSURE: 60 MMHG | SYSTOLIC BLOOD PRESSURE: 90 MMHG

## 2021-01-26 VITALS — SYSTOLIC BLOOD PRESSURE: 113 MMHG | DIASTOLIC BLOOD PRESSURE: 66 MMHG

## 2021-01-26 VITALS — DIASTOLIC BLOOD PRESSURE: 73 MMHG | SYSTOLIC BLOOD PRESSURE: 113 MMHG

## 2021-01-26 VITALS — SYSTOLIC BLOOD PRESSURE: 103 MMHG | DIASTOLIC BLOOD PRESSURE: 77 MMHG

## 2021-01-26 VITALS — SYSTOLIC BLOOD PRESSURE: 91 MMHG | DIASTOLIC BLOOD PRESSURE: 62 MMHG

## 2021-01-26 VITALS — DIASTOLIC BLOOD PRESSURE: 56 MMHG | SYSTOLIC BLOOD PRESSURE: 92 MMHG

## 2021-01-26 VITALS — DIASTOLIC BLOOD PRESSURE: 79 MMHG | SYSTOLIC BLOOD PRESSURE: 121 MMHG

## 2021-01-26 VITALS — DIASTOLIC BLOOD PRESSURE: 68 MMHG | SYSTOLIC BLOOD PRESSURE: 98 MMHG

## 2021-01-26 VITALS — SYSTOLIC BLOOD PRESSURE: 100 MMHG | DIASTOLIC BLOOD PRESSURE: 87 MMHG

## 2021-01-26 VITALS — SYSTOLIC BLOOD PRESSURE: 121 MMHG | DIASTOLIC BLOOD PRESSURE: 80 MMHG

## 2021-01-26 VITALS — DIASTOLIC BLOOD PRESSURE: 74 MMHG | SYSTOLIC BLOOD PRESSURE: 97 MMHG

## 2021-01-26 VITALS — SYSTOLIC BLOOD PRESSURE: 102 MMHG | DIASTOLIC BLOOD PRESSURE: 68 MMHG

## 2021-01-26 VITALS — SYSTOLIC BLOOD PRESSURE: 127 MMHG | DIASTOLIC BLOOD PRESSURE: 86 MMHG

## 2021-01-26 VITALS — SYSTOLIC BLOOD PRESSURE: 81 MMHG | DIASTOLIC BLOOD PRESSURE: 55 MMHG

## 2021-01-26 VITALS — DIASTOLIC BLOOD PRESSURE: 62 MMHG | SYSTOLIC BLOOD PRESSURE: 112 MMHG

## 2021-01-26 VITALS — SYSTOLIC BLOOD PRESSURE: 94 MMHG | DIASTOLIC BLOOD PRESSURE: 69 MMHG

## 2021-01-26 VITALS — DIASTOLIC BLOOD PRESSURE: 56 MMHG | SYSTOLIC BLOOD PRESSURE: 104 MMHG

## 2021-01-26 VITALS — SYSTOLIC BLOOD PRESSURE: 88 MMHG | DIASTOLIC BLOOD PRESSURE: 52 MMHG

## 2021-01-26 VITALS — DIASTOLIC BLOOD PRESSURE: 57 MMHG | SYSTOLIC BLOOD PRESSURE: 110 MMHG

## 2021-01-26 VITALS — SYSTOLIC BLOOD PRESSURE: 105 MMHG | DIASTOLIC BLOOD PRESSURE: 87 MMHG

## 2021-01-26 VITALS — SYSTOLIC BLOOD PRESSURE: 91 MMHG | DIASTOLIC BLOOD PRESSURE: 67 MMHG

## 2021-01-26 VITALS — DIASTOLIC BLOOD PRESSURE: 67 MMHG | SYSTOLIC BLOOD PRESSURE: 111 MMHG

## 2021-01-26 VITALS — DIASTOLIC BLOOD PRESSURE: 72 MMHG | SYSTOLIC BLOOD PRESSURE: 102 MMHG

## 2021-01-26 VITALS — SYSTOLIC BLOOD PRESSURE: 85 MMHG | DIASTOLIC BLOOD PRESSURE: 54 MMHG

## 2021-01-26 VITALS — DIASTOLIC BLOOD PRESSURE: 59 MMHG | SYSTOLIC BLOOD PRESSURE: 104 MMHG

## 2021-01-26 VITALS — DIASTOLIC BLOOD PRESSURE: 71 MMHG | SYSTOLIC BLOOD PRESSURE: 91 MMHG

## 2021-01-26 VITALS — DIASTOLIC BLOOD PRESSURE: 72 MMHG | SYSTOLIC BLOOD PRESSURE: 93 MMHG

## 2021-01-26 VITALS — SYSTOLIC BLOOD PRESSURE: 121 MMHG | DIASTOLIC BLOOD PRESSURE: 58 MMHG

## 2021-01-26 VITALS — DIASTOLIC BLOOD PRESSURE: 67 MMHG | SYSTOLIC BLOOD PRESSURE: 104 MMHG

## 2021-01-26 LAB
ALBUMIN FLD-MCNC: 2.2 G/DL (ref 3.4–5)
ANION GAP SERPL CALCULATED.3IONS-SCNC: 14.1 MMOL/L (ref 8–16)
AST SERPL-CCNC: 247 U/L (ref 15–37)
BASOPHILS # BLD AUTO: 0.1 K/UL (ref 0–0.22)
BASOPHILS NFR BLD AUTO: 0.3 % (ref 0–2)
BILIRUB SERPL-MCNC: 0.9 MG/DL (ref 0–1)
BUN SERPL-MCNC: 93 MG/DL (ref 7–18)
CHLORIDE SERPL-SCNC: 110 MMOL/L (ref 98–107)
CO2 SERPL-SCNC: 31.1 MMOL/L (ref 21–32)
CREAT SERPL-MCNC: 1.6 MG/DL (ref 0.6–1.3)
EOSINOPHIL # BLD AUTO: 0 K/UL (ref 0–0.4)
EOSINOPHIL NFR BLD AUTO: 0 % (ref 0–4)
ERYTHROCYTE [DISTWIDTH] IN BLOOD BY AUTOMATED COUNT: 16.1 % (ref 11.6–13.7)
GFR SERPL CREATININE-BSD FRML MDRD: 57 ML/MIN (ref 90–?)
GLUCOSE SERPL-MCNC: 250 MG/DL (ref 74–106)
HCT VFR BLD AUTO: 47.7 % (ref 36–52)
HGB BLD-MCNC: 14.7 G/DL (ref 12–18)
LYMPHOCYTES # BLD AUTO: 1 K/UL (ref 2–11.5)
LYMPHOCYTES NFR BLD AUTO: 4 % (ref 20.5–51.1)
MCH RBC QN AUTO: 28 PG (ref 27–31)
MCHC RBC AUTO-ENTMCNC: 31 G/DL (ref 33–37)
MCV RBC AUTO: 89.5 FL (ref 80–94)
MONOCYTES # BLD AUTO: 1.4 K/UL (ref 0.8–1)
MONOCYTES NFR BLD AUTO: 6 % (ref 1.7–9.3)
NEUTROPHILS # BLD AUTO: 21.6 K/UL (ref 1.8–7.7)
NEUTROPHILS NFR BLD AUTO: 89.7 % (ref 42.2–75.2)
PLATELET # BLD AUTO: 228 K/UL (ref 140–450)
POTASSIUM SERPL-SCNC: 4.2 MMOL/L (ref 3.5–5.1)
RBC # BLD AUTO: 5.34 MIL/UL (ref 4.2–6.1)
SODIUM SERPL-SCNC: 151 MMOL/L (ref 136–145)
WBC # BLD AUTO: 24.1 K/UL (ref 4.8–10.8)

## 2021-01-26 RX ADMIN — DEXTROSE SCH MLS/HR: 50 INJECTION, SOLUTION INTRAVENOUS at 05:00

## 2021-01-26 RX ADMIN — INSULIN LISPRO PRN UNITS: 100 INJECTION, SOLUTION INTRAVENOUS; SUBCUTANEOUS at 02:09

## 2021-01-26 RX ADMIN — PANTOPRAZOLE SODIUM SCH MG: 40 INJECTION, POWDER, FOR SOLUTION INTRAVENOUS at 08:47

## 2021-01-26 RX ADMIN — Medication SCH DEV: at 17:34

## 2021-01-26 RX ADMIN — FOLIC ACID SCH MLS/HR: 5 INJECTION, SOLUTION INTRAMUSCULAR; INTRAVENOUS; SUBCUTANEOUS at 21:01

## 2021-01-26 RX ADMIN — DEXTROSE SCH MLS/HR: 50 INJECTION, SOLUTION INTRAVENOUS at 21:01

## 2021-01-26 RX ADMIN — NYSTATIN SCH GM: 100000 CREAM TOPICAL at 13:01

## 2021-01-26 RX ADMIN — FOLIC ACID SCH MLS/HR: 5 INJECTION, SOLUTION INTRAMUSCULAR; INTRAVENOUS; SUBCUTANEOUS at 07:15

## 2021-01-26 RX ADMIN — INSULIN LISPRO PRN UNITS: 100 INJECTION, SOLUTION INTRAVENOUS; SUBCUTANEOUS at 11:15

## 2021-01-26 RX ADMIN — DEXAMETHASONE SODIUM PHOSPHATE SCH MG: 4 INJECTION, SOLUTION INTRAMUSCULAR; INTRAVENOUS at 14:30

## 2021-01-26 RX ADMIN — METOPROLOL TARTRATE PRN MG: 5 INJECTION, SOLUTION INTRAVENOUS at 23:06

## 2021-01-26 RX ADMIN — DEXTROSE SCH MLS/HR: 50 INJECTION, SOLUTION INTRAVENOUS at 13:00

## 2021-01-26 RX ADMIN — Medication SCH DEV: at 06:00

## 2021-01-26 RX ADMIN — Medication SCH DEV: at 23:45

## 2021-01-26 RX ADMIN — ATORVASTATIN CALCIUM SCH MG: 20 TABLET, FILM COATED ORAL at 08:47

## 2021-01-26 RX ADMIN — INSULIN LISPRO PRN UNITS: 100 INJECTION, SOLUTION INTRAVENOUS; SUBCUTANEOUS at 17:36

## 2021-01-26 RX ADMIN — DEXTROSE SCH MLS/HR: 50 INJECTION, SOLUTION INTRAVENOUS at 07:54

## 2021-01-26 RX ADMIN — Medication SCH DEV: at 11:24

## 2021-01-26 RX ADMIN — METOPROLOL TARTRATE PRN MG: 5 INJECTION, SOLUTION INTRAVENOUS at 04:30

## 2021-01-26 RX ADMIN — NYSTATIN SCH GM: 100000 CREAM TOPICAL at 01:00

## 2021-01-26 RX ADMIN — Medication SCH DEV: at 00:00

## 2021-01-26 NOTE — NUR
RECEIVED REPORT FROM DAY NURSE; PT HAS EYES OPEN SITTING UP IN BED; SLUGGISH PUPILS +4 
REACTIVE, + GAG REFLEX, ETT TO VENT 40% FIO2. AFLUTTER 130-140S OGT IN PLACE, GLUCERNA 
FEEDINGS RUNNING. RAMIREZ CATH IN PLACE YELLOW URINE NOTED. SKIN NON INTACT, REDNESS TO 
BILATERAL AXILLARY AREA NOTED. L UPPER ARM PICC LINE NOTED, R UPPER ARM 18 G PIV NOTED. BED 
LOCKED IN LOWEST POSITION, SAFETY PRECUATIONS IN PLACE, WILL CONTINUE TO OBSERVE.

## 2021-01-26 NOTE — NUR
SPOKE TO DR CHAVEZ AT BEDSIDE, PT IS HAVING FACIAL SEIZURES, , PER  TO ORDER 
ATIVAN 4MG AND GIVE RIGHT NOW. WILL CONTINUE WITH ORDERS.

## 2021-01-26 NOTE — NUR
1/26/21 RD FOLLOW UP COMPLETED



PLEASE REFER TO NUTRITION ASSESSMENT UNDER CARE ACTIVITY FOR ESTIMATED NUTRITIONAL NEEDS. 



1. CONT. GLUCERNA 1.2 @ GOAL RATE OF 65 ML/HR X 24 HR

- THIS WILL PROVIDE 1872 KCAL AND 94 G OF PROTEIN MEETING 99% OF ESTIMATED KCAL NEEDS AND 
83% OF ESTIMATED PROTEIN NEEDS

2. CONT. FREE WATER FLUSH 200 ML Q4H

3. CONTACT RD PRN FOR ANY CHANGES

4. RD TO FOLLOW UP IN 2-3 DAYS, HIGH RISK



DANIEL SAVAGE RD

## 2021-01-26 NOTE — NUR
REPORT GIVEN BY RICHARD RODRIGUEZ PT ORALLY INTUBATED TO VENTILATOR W/ FIO2 40% O2 SAT 90%.W/ PATENT 
CONTRAPTIONS. PT'S HEART RATE NOT STABLE ON CARDIZEM DRIP @ 15MG/HR.. NON RESPONSIVE TO 
VERBAL COMMAND. STILL W/ GAG REFLEX.

## 2021-01-26 NOTE — NUR
PT'SHEART RATE WENT UP  BEATS/MIN. CARDIZEM DRIP @ 15MG/HOUR. PAGED DR. HENDERSON VIA 
EXCHANGE @ 1432.AWAITINF FOR HIS REPONSE.

## 2021-01-26 NOTE — NUR
COMPLETE BED BATH DONE ORAL CARE RENDERED AND SUCTIONING OF ORAL AND VIA ET TUBE DONE 
OBTAINED LARGE AMOUNT OF WHITISH 

PHLEGM. MAINTAINED @ 30 DEGREES HOB TO FACILITATE EASY BREATHING AND PREVENT ASPIRATION.

## 2021-01-27 VITALS — SYSTOLIC BLOOD PRESSURE: 81 MMHG | DIASTOLIC BLOOD PRESSURE: 51 MMHG

## 2021-01-27 VITALS — SYSTOLIC BLOOD PRESSURE: 108 MMHG | DIASTOLIC BLOOD PRESSURE: 68 MMHG

## 2021-01-27 VITALS — DIASTOLIC BLOOD PRESSURE: 73 MMHG | SYSTOLIC BLOOD PRESSURE: 99 MMHG

## 2021-01-27 VITALS — DIASTOLIC BLOOD PRESSURE: 56 MMHG | SYSTOLIC BLOOD PRESSURE: 89 MMHG

## 2021-01-27 VITALS — SYSTOLIC BLOOD PRESSURE: 99 MMHG | DIASTOLIC BLOOD PRESSURE: 64 MMHG

## 2021-01-27 VITALS — SYSTOLIC BLOOD PRESSURE: 87 MMHG | DIASTOLIC BLOOD PRESSURE: 47 MMHG

## 2021-01-27 VITALS — SYSTOLIC BLOOD PRESSURE: 99 MMHG | DIASTOLIC BLOOD PRESSURE: 73 MMHG

## 2021-01-27 VITALS — DIASTOLIC BLOOD PRESSURE: 70 MMHG | SYSTOLIC BLOOD PRESSURE: 93 MMHG

## 2021-01-27 VITALS — SYSTOLIC BLOOD PRESSURE: 100 MMHG | DIASTOLIC BLOOD PRESSURE: 58 MMHG

## 2021-01-27 VITALS — SYSTOLIC BLOOD PRESSURE: 98 MMHG | DIASTOLIC BLOOD PRESSURE: 73 MMHG

## 2021-01-27 VITALS — DIASTOLIC BLOOD PRESSURE: 67 MMHG | SYSTOLIC BLOOD PRESSURE: 95 MMHG

## 2021-01-27 VITALS — DIASTOLIC BLOOD PRESSURE: 59 MMHG | SYSTOLIC BLOOD PRESSURE: 100 MMHG

## 2021-01-27 VITALS — SYSTOLIC BLOOD PRESSURE: 95 MMHG | DIASTOLIC BLOOD PRESSURE: 72 MMHG

## 2021-01-27 RX ADMIN — ATORVASTATIN CALCIUM SCH MG: 20 TABLET, FILM COATED ORAL at 09:37

## 2021-01-27 RX ADMIN — PANTOPRAZOLE SODIUM SCH MG: 40 INJECTION, POWDER, FOR SOLUTION INTRAVENOUS at 09:37

## 2021-01-27 RX ADMIN — NYSTATIN SCH GM: 100000 CREAM TOPICAL at 01:00

## 2021-01-27 NOTE — NUR
REPORT GIVEN BY RICHARD RODRIGUEZ RESTING COMFORTABLY ON BED W/ PATENT CONTRAPTIONS. STILL ON CARDIZEM 
DRIP.NO GACIAL GRIMACES SEEN.

## 2021-01-27 NOTE — NUR
REC'D PT ON CARESCAPE VENT SETTINGS AC 16  PEEP 5 FIO2 40% ALARMS ON AND AUDIBLE AND 
VENT IS PLUGGED INTO RED OUTLET, SXN PT SMALL AMT OF YELLOW SECRETIONS, B\S ARE RHONCHI 
BILATERALLY PT IS ORALLY INTUBATED WITH 7.5 ETT AT 23CM WILL CONTINUE TO MONITOR PT

## 2021-01-27 NOTE — NUR
PT WENT TO [facility].NO PULSE AND BP APPRECIATED. CALLED UP ER DR FERNANDEZ EXAMINED AND 
PRONOUNCED CLINICALLY DEAD.POST MORTEM CARE RENDERED.INFORMED PT'S BROTHER ZAYDA. 
[facility] [place] [facility]. CONSENT TAKEN FROM PT'S BROTHER TO 
RELEASE THE BODY.AWAITING TO THE MORTUARY PERSONNEL TO  THE BODY.

## 2021-01-27 NOTE — NUR
PT'S FAMILY CAME IN AND WANTED TO TERMINATE CARE AND EXTUBATE AND FOR COMFORT CARE.PAGED FOR 
DR. ACOSTA BUT EXCHANGE SAID IT WILL BE DR. NOWAK,PAGED AND RESPONDED @0512 MADE HIM AWARE 
ABOUT FAMILY'S WISH TO TERMINATE LIFE SUPPORT. AND LET DR GARCIA  BE INFORMED. PAGED DR. GARCIA AND SUDDENLY CAME IN AND SPOKE W PT'S FAMILY MEMBER REGARDING COMFORT CARE.AND 
MORPHINE DRIP.

## 2024-08-23 NOTE — NUR
Labs drawn via port    Bridger Clayton  tolerated treatment well with no complications.      Bridger Clayton is aware of future appt on 8/27/24     AVS printed and given to Bridger Clayton:    No (Declined by Bridger Clayton)        RECEIVED REPORT FROM DAY SHIFT RN; PT SEDATED ON PROPOFOL DRIP RASS-3. SEIZURE PRECAUTIONS 
IN PLACE. SR ST 90-110S, PALPALBLE PULSES, +2 GENERALIZED EDEMA, ETT TO VENT ON 50% FIO2, 
RED SECRETIONS NOTED FROM INLINE SUCTION RT/MD AWARE. OGT IN PLACE, FEEDING ON HOLD, 
RESIDUALS>250ML. ACTIVE BOWEL SOUNDS NOTED. RAMIREZ IN PLACE; DARK RUDI URINE NOTED, <100ML 
FROM PREVIOUS SHIFT; OLIGURIC. SKIN INTACT. R UPPER ARM PICC IN PLACE, SLUGGISH. X2 PIV TO 
RAC AND RUPPER ARM IN PLACE. LEVOPHED @ 12 MCG/MIN. SAFETY PRECAUTIONS IN PLACE, HOB>30 
DEGREES. BED LOCKED IN LOWEST POSITION. WILL CONTINUE TO OBSERVE.